# Patient Record
Sex: MALE | Race: WHITE | NOT HISPANIC OR LATINO | Employment: FULL TIME | URBAN - NONMETROPOLITAN AREA
[De-identification: names, ages, dates, MRNs, and addresses within clinical notes are randomized per-mention and may not be internally consistent; named-entity substitution may affect disease eponyms.]

---

## 2017-03-13 RX ORDER — DICYCLOMINE HCL 20 MG
TABLET ORAL
Qty: 60 TABLET | Refills: 12 | Status: SHIPPED | OUTPATIENT
Start: 2017-03-13 | End: 2017-03-29 | Stop reason: SDUPTHER

## 2017-03-29 ENCOUNTER — OFFICE VISIT (OUTPATIENT)
Dept: FAMILY MEDICINE CLINIC | Facility: CLINIC | Age: 52
End: 2017-03-29

## 2017-03-29 VITALS
HEIGHT: 68 IN | DIASTOLIC BLOOD PRESSURE: 80 MMHG | BODY MASS INDEX: 35.25 KG/M2 | SYSTOLIC BLOOD PRESSURE: 136 MMHG | TEMPERATURE: 98.2 F | WEIGHT: 232.6 LBS | HEART RATE: 80 BPM | OXYGEN SATURATION: 98 %

## 2017-03-29 DIAGNOSIS — F33.1 MODERATE EPISODE OF RECURRENT MAJOR DEPRESSIVE DISORDER (HCC): ICD-10-CM

## 2017-03-29 DIAGNOSIS — E29.1 MALE HYPOGONADISM: Primary | ICD-10-CM

## 2017-03-29 DIAGNOSIS — Z79.899 OTHER LONG TERM (CURRENT) DRUG THERAPY: ICD-10-CM

## 2017-03-29 DIAGNOSIS — E78.5 HYPERLIPIDEMIA, UNSPECIFIED HYPERLIPIDEMIA TYPE: ICD-10-CM

## 2017-03-29 DIAGNOSIS — Z12.5 ENCOUNTER FOR PROSTATE CANCER SCREENING: ICD-10-CM

## 2017-03-29 LAB
ALBUMIN SERPL-MCNC: 4.7 G/DL (ref 3.4–4.8)
ALBUMIN/GLOB SERPL: 1.9 G/DL (ref 1.1–1.8)
ALP SERPL-CCNC: 84 U/L (ref 38–126)
ALT SERPL W P-5'-P-CCNC: 25 U/L (ref 21–72)
ANION GAP SERPL CALCULATED.3IONS-SCNC: 13 MMOL/L (ref 5–15)
ARTICHOKE IGE QN: 134 MG/DL (ref 1–129)
AST SERPL-CCNC: 15 U/L (ref 17–59)
BILIRUB SERPL-MCNC: 0.8 MG/DL (ref 0.2–1.3)
BUN BLD-MCNC: 12 MG/DL (ref 7–21)
BUN/CREAT SERPL: 12.2 (ref 7–25)
CALCIUM SPEC-SCNC: 9.9 MG/DL (ref 8.4–10.2)
CHLORIDE SERPL-SCNC: 100 MMOL/L (ref 95–110)
CHOLEST SERPL-MCNC: 201 MG/DL (ref 0–199)
CO2 SERPL-SCNC: 26 MMOL/L (ref 22–31)
CREAT BLD-MCNC: 0.98 MG/DL (ref 0.7–1.3)
DEPRECATED RDW RBC AUTO: 40 FL (ref 35.1–43.9)
ERYTHROCYTE [DISTWIDTH] IN BLOOD BY AUTOMATED COUNT: 13.2 % (ref 11.5–14.5)
GFR SERPL CREATININE-BSD FRML MDRD: 81 ML/MIN/1.73 (ref 56–130)
GLOBULIN UR ELPH-MCNC: 2.5 GM/DL (ref 2.3–3.5)
GLUCOSE BLD-MCNC: 96 MG/DL (ref 60–100)
HCT VFR BLD AUTO: 41 % (ref 39–49)
HDLC SERPL-MCNC: 37 MG/DL (ref 60–200)
HGB BLD-MCNC: 14 G/DL (ref 13.7–17.3)
LDLC/HDLC SERPL: 3.75 {RATIO} (ref 0–3.55)
MCH RBC QN AUTO: 28.5 PG (ref 26.5–34)
MCHC RBC AUTO-ENTMCNC: 34.1 G/DL (ref 31.5–36.3)
MCV RBC AUTO: 83.3 FL (ref 80–98)
PLATELET # BLD AUTO: 327 10*3/MM3 (ref 150–450)
PMV BLD AUTO: 11.1 FL (ref 8–12)
POTASSIUM BLD-SCNC: 4.5 MMOL/L (ref 3.5–5.1)
PROT SERPL-MCNC: 7.2 G/DL (ref 6.3–8.6)
PSA SERPL-MCNC: 0.47 NG/ML (ref 0–4)
RBC # BLD AUTO: 4.92 10*6/MM3 (ref 4.37–5.74)
SODIUM BLD-SCNC: 139 MMOL/L (ref 137–145)
TRIGL SERPL-MCNC: 126 MG/DL (ref 20–199)
WBC NRBC COR # BLD: 7.36 10*3/MM3 (ref 3.2–9.8)

## 2017-03-29 PROCEDURE — 84403 ASSAY OF TOTAL TESTOSTERONE: CPT | Performed by: FAMILY MEDICINE

## 2017-03-29 PROCEDURE — 80061 LIPID PANEL: CPT | Performed by: FAMILY MEDICINE

## 2017-03-29 PROCEDURE — 85027 COMPLETE CBC AUTOMATED: CPT | Performed by: FAMILY MEDICINE

## 2017-03-29 PROCEDURE — 80053 COMPREHEN METABOLIC PANEL: CPT | Performed by: FAMILY MEDICINE

## 2017-03-29 PROCEDURE — 99213 OFFICE O/P EST LOW 20 MIN: CPT | Performed by: FAMILY MEDICINE

## 2017-03-29 PROCEDURE — G0103 PSA SCREENING: HCPCS | Performed by: FAMILY MEDICINE

## 2017-03-29 RX ORDER — LISINOPRIL 20 MG/1
20 TABLET ORAL DAILY
Qty: 90 TABLET | Refills: 3 | Status: SHIPPED | OUTPATIENT
Start: 2017-03-29 | End: 2018-03-27 | Stop reason: SDUPTHER

## 2017-03-29 RX ORDER — ESCITALOPRAM OXALATE 20 MG/1
20 TABLET ORAL DAILY
Qty: 90 TABLET | Refills: 3 | Status: SHIPPED | OUTPATIENT
Start: 2017-03-29 | End: 2017-07-12

## 2017-03-29 RX ORDER — DICYCLOMINE HCL 20 MG
20 TABLET ORAL EVERY 6 HOURS
Qty: 360 TABLET | Refills: 3 | Status: SHIPPED | OUTPATIENT
Start: 2017-03-29 | End: 2018-04-05 | Stop reason: SDUPTHER

## 2017-03-29 NOTE — PROGRESS NOTES
Subjective   Rob Josh Serrato is a 51 y.o. male.     History of Present Illness     No testosterone 1 month.  Can tell a difference.  Has lost weight and is motivated to do more  20mg lexapro helping.  Due for bloodwork.  Social issues at home with son are little better.     Review of Systems   Constitutional: Negative for chills, fatigue and fever.   HENT: Negative for congestion, ear discharge, ear pain, facial swelling, hearing loss, postnasal drip, rhinorrhea, sinus pressure, sore throat, trouble swallowing and voice change.    Eyes: Negative for discharge, redness and visual disturbance.   Respiratory: Negative for cough, chest tightness, shortness of breath and wheezing.    Cardiovascular: Negative for chest pain and palpitations.   Gastrointestinal: Negative for abdominal pain, blood in stool, constipation, diarrhea, nausea and vomiting.   Endocrine: Negative for polydipsia and polyuria.   Genitourinary: Negative for dysuria, flank pain, hematuria and urgency.   Musculoskeletal: Negative for arthralgias, back pain, joint swelling and myalgias.   Skin: Negative for rash.   Neurological: Negative for dizziness, weakness, numbness and headaches.   Hematological: Negative for adenopathy.   Psychiatric/Behavioral: Negative for confusion and sleep disturbance. The patient is not nervous/anxious.        Objective   Physical Exam   Constitutional: He is oriented to person, place, and time. He appears well-developed and well-nourished.   HENT:   Head: Normocephalic and atraumatic.   Right Ear: External ear normal.   Left Ear: External ear normal.   Nose: Nose normal.   Mouth/Throat: Oropharynx is clear and moist.   Eyes: Conjunctivae and EOM are normal. Pupils are equal, round, and reactive to light.   Neck: Normal range of motion. Neck supple.   Cardiovascular: Normal rate, regular rhythm and normal heart sounds.  Exam reveals no gallop and no friction rub.    No murmur heard.  Pulmonary/Chest: Effort normal and  breath sounds normal.   Abdominal: Soft. Bowel sounds are normal. He exhibits no distension. There is no tenderness. There is no rebound and no guarding.   Musculoskeletal: Normal range of motion. He exhibits no edema or deformity.   Neurological: He is alert and oriented to person, place, and time. No cranial nerve deficit.   Skin: Skin is warm and dry. No rash noted. No erythema.   Psychiatric: He has a normal mood and affect. His behavior is normal. Judgment and thought content normal.   Nursing note and vitals reviewed.      Assessment/Plan   Rob was seen today for follow-up, med refill and annual exam.    Diagnoses and all orders for this visit:    Male hypogonadism  -     CBC (No Diff)  -     Comprehensive Metabolic Panel  -     Lipid Panel  -     Testosterone    Hyperlipidemia, unspecified hyperlipidemia type  -     Lipid Panel    Other long term (current) drug therapy  -     CBC (No Diff)  -     Comprehensive Metabolic Panel  -     Lipid Panel  -     PSA Screen    Encounter for prostate cancer screening  -     PSA Screen    Moderate episode of recurrent major depressive disorder  -     escitalopram (LEXAPRO) 20 MG tablet; Take 1 tablet by mouth Daily.    Other orders  -     lisinopril (PRINIVIL,ZESTRIL) 20 MG tablet; Take 1 tablet by mouth Daily.  -     dicyclomine (BENTYL) 20 MG tablet; Take 1 tablet by mouth Every 6 (Six) Hours.    praised for weight loss.  Will help testosterone.  Try no testosterone and see how he does.  We discussed some of the bad side effects of testosterone replacement.     labwork    Also, try to cut down on lexapro dose spring and summer so can increase in fall/winter if needed.

## 2017-03-30 LAB
CONV COMMENT: ABNORMAL
TESTOST SERPL-MCNC: 277 NG/DL (ref 348–1197)

## 2017-04-13 ENCOUNTER — TELEPHONE (OUTPATIENT)
Dept: FAMILY MEDICINE CLINIC | Facility: CLINIC | Age: 52
End: 2017-04-13

## 2017-04-13 RX ORDER — TESTOSTERONE CYPIONATE 200 MG/ML
200 INJECTION, SOLUTION INTRAMUSCULAR
Qty: 2 ML | Refills: 5 | Status: SHIPPED | OUTPATIENT
Start: 2017-04-13 | End: 2019-02-25 | Stop reason: SDUPTHER

## 2017-04-13 NOTE — TELEPHONE ENCOUNTER
----- Message from Ajit Washington sent at 4/13/2017  2:15 PM CDT -----  HE ASKED IF HE SHOULD TAKE TESTOSTERONE EVERY OTHER WEEK TO KEEP LEVELS UP.    ----- Message -----     From: Cb Weaver MD     Sent: 4/13/2017   1:39 PM       To: Ajit Washington    tesosterone low but you hadn't been taking any testosterone.  Otherwise all good!  ----- Message -----     From: Ajit Washington     Sent: 4/13/2017  10:36 AM       To: Cb Weaver MD    PT CALLED ABOUT LAB RESULTS

## 2017-07-11 ENCOUNTER — APPOINTMENT (OUTPATIENT)
Dept: GENERAL RADIOLOGY | Facility: HOSPITAL | Age: 52
End: 2017-07-11

## 2017-07-11 ENCOUNTER — HOSPITAL ENCOUNTER (EMERGENCY)
Facility: HOSPITAL | Age: 52
Discharge: HOME | End: 2017-07-11
Admitting: EMERGENCY MEDICINE

## 2017-07-11 VITALS
WEIGHT: 234 LBS | TEMPERATURE: 97.9 F | HEIGHT: 65 IN | OXYGEN SATURATION: 98 % | DIASTOLIC BLOOD PRESSURE: 56 MMHG | SYSTOLIC BLOOD PRESSURE: 112 MMHG | BODY MASS INDEX: 38.99 KG/M2 | RESPIRATION RATE: 18 BRPM | HEART RATE: 61 BPM

## 2017-07-11 DIAGNOSIS — R07.89 CHEST WALL PAIN: Primary | ICD-10-CM

## 2017-07-11 LAB
ALBUMIN SERPL-MCNC: 4.5 G/DL (ref 3.4–4.8)
ALBUMIN/GLOB SERPL: 1.5 G/DL (ref 1.1–1.8)
ALP SERPL-CCNC: 89 U/L (ref 38–126)
ALT SERPL W P-5'-P-CCNC: 32 U/L (ref 21–72)
ANION GAP SERPL CALCULATED.3IONS-SCNC: 11 MMOL/L (ref 5–15)
AST SERPL-CCNC: 20 U/L (ref 17–59)
BASOPHILS # BLD AUTO: 0.03 10*3/MM3 (ref 0–0.2)
BASOPHILS NFR BLD AUTO: 0.3 % (ref 0–2)
BILIRUB SERPL-MCNC: 0.6 MG/DL (ref 0.2–1.3)
BUN BLD-MCNC: 14 MG/DL (ref 7–21)
BUN/CREAT SERPL: 15.2 (ref 7–25)
CALCIUM SPEC-SCNC: 9.6 MG/DL (ref 8.4–10.2)
CHLORIDE SERPL-SCNC: 100 MMOL/L (ref 95–110)
CO2 SERPL-SCNC: 28 MMOL/L (ref 22–31)
CREAT BLD-MCNC: 0.92 MG/DL (ref 0.7–1.3)
DEPRECATED RDW RBC AUTO: 42.1 FL (ref 35.1–43.9)
EOSINOPHIL # BLD AUTO: 0.1 10*3/MM3 (ref 0–0.7)
EOSINOPHIL NFR BLD AUTO: 1 % (ref 0–7)
ERYTHROCYTE [DISTWIDTH] IN BLOOD BY AUTOMATED COUNT: 13.7 % (ref 11.5–14.5)
GFR SERPL CREATININE-BSD FRML MDRD: 87 ML/MIN/1.73 (ref 60–130)
GLOBULIN UR ELPH-MCNC: 3.1 GM/DL (ref 2.3–3.5)
GLUCOSE BLD-MCNC: 92 MG/DL (ref 60–100)
HCT VFR BLD AUTO: 43.4 % (ref 39–49)
HGB BLD-MCNC: 14.6 G/DL (ref 13.7–17.3)
HOLD SPECIMEN: NORMAL
HOLD SPECIMEN: NORMAL
IMM GRANULOCYTES # BLD: 0.05 10*3/MM3 (ref 0–0.02)
IMM GRANULOCYTES NFR BLD: 0.5 % (ref 0–0.5)
LYMPHOCYTES # BLD AUTO: 3.34 10*3/MM3 (ref 0.6–4.2)
LYMPHOCYTES NFR BLD AUTO: 34.3 % (ref 10–50)
MCH RBC QN AUTO: 28.6 PG (ref 26.5–34)
MCHC RBC AUTO-ENTMCNC: 33.6 G/DL (ref 31.5–36.3)
MCV RBC AUTO: 84.9 FL (ref 80–98)
MONOCYTES # BLD AUTO: 0.65 10*3/MM3 (ref 0–0.9)
MONOCYTES NFR BLD AUTO: 6.7 % (ref 0–12)
NEUTROPHILS # BLD AUTO: 5.58 10*3/MM3 (ref 2–8.6)
NEUTROPHILS NFR BLD AUTO: 57.2 % (ref 37–80)
NT-PROBNP SERPL-MCNC: <11.1 PG/ML (ref 0–900)
PLATELET # BLD AUTO: 288 10*3/MM3 (ref 150–450)
PMV BLD AUTO: 11.4 FL (ref 8–12)
POTASSIUM BLD-SCNC: 3.8 MMOL/L (ref 3.5–5.1)
PROT SERPL-MCNC: 7.6 G/DL (ref 6.3–8.6)
RBC # BLD AUTO: 5.11 10*6/MM3 (ref 4.37–5.74)
SODIUM BLD-SCNC: 139 MMOL/L (ref 137–145)
TROPONIN I SERPL-MCNC: 0.01 NG/ML
TROPONIN I SERPL-MCNC: <0.012 NG/ML
WBC NRBC COR # BLD: 9.75 10*3/MM3 (ref 3.2–9.8)
WHOLE BLOOD HOLD SPECIMEN: NORMAL
WHOLE BLOOD HOLD SPECIMEN: NORMAL

## 2017-07-11 PROCEDURE — 36415 COLL VENOUS BLD VENIPUNCTURE: CPT

## 2017-07-11 PROCEDURE — 96361 HYDRATE IV INFUSION ADD-ON: CPT

## 2017-07-11 PROCEDURE — 85025 COMPLETE CBC W/AUTO DIFF WBC: CPT

## 2017-07-11 PROCEDURE — 80053 COMPREHEN METABOLIC PANEL: CPT

## 2017-07-11 PROCEDURE — 99284 EMERGENCY DEPT VISIT MOD MDM: CPT

## 2017-07-11 PROCEDURE — 84484 ASSAY OF TROPONIN QUANT: CPT

## 2017-07-11 PROCEDURE — 96360 HYDRATION IV INFUSION INIT: CPT

## 2017-07-11 PROCEDURE — 71020 HC CHEST PA AND LATERAL: CPT

## 2017-07-11 PROCEDURE — 93005 ELECTROCARDIOGRAM TRACING: CPT

## 2017-07-11 PROCEDURE — 84484 ASSAY OF TROPONIN QUANT: CPT | Performed by: PHYSICIAN ASSISTANT

## 2017-07-11 PROCEDURE — 83880 ASSAY OF NATRIURETIC PEPTIDE: CPT

## 2017-07-11 PROCEDURE — 93010 ELECTROCARDIOGRAM REPORT: CPT | Performed by: INTERNAL MEDICINE

## 2017-07-11 RX ORDER — ALUMINA, MAGNESIA, AND SIMETHICONE 2400; 2400; 240 MG/30ML; MG/30ML; MG/30ML
15 SUSPENSION ORAL ONCE
Status: COMPLETED | OUTPATIENT
Start: 2017-07-11 | End: 2017-07-11

## 2017-07-11 RX ORDER — VENLAFAXINE HYDROCHLORIDE 150 MG/1
150 CAPSULE, EXTENDED RELEASE ORAL
COMMUNITY
End: 2017-07-12 | Stop reason: ALTCHOICE

## 2017-07-11 RX ORDER — ASPIRIN 81 MG/1
324 TABLET, CHEWABLE ORAL ONCE
Status: COMPLETED | OUTPATIENT
Start: 2017-07-11 | End: 2017-07-11

## 2017-07-11 RX ORDER — SODIUM CHLORIDE 0.9 % (FLUSH) 0.9 %
10 SYRINGE (ML) INJECTION AS NEEDED
Status: DISCONTINUED | OUTPATIENT
Start: 2017-07-11 | End: 2017-07-11 | Stop reason: HOSPADM

## 2017-07-11 RX ORDER — NITROGLYCERIN 0.4 MG/1
0.4 TABLET SUBLINGUAL
Status: DISCONTINUED | OUTPATIENT
Start: 2017-07-11 | End: 2017-07-11 | Stop reason: HOSPADM

## 2017-07-11 RX ADMIN — NITROGLYCERIN 0.4 MG: 0.4 TABLET SUBLINGUAL at 11:02

## 2017-07-11 RX ADMIN — ASPIRIN 81 MG 324 MG: 81 TABLET ORAL at 11:02

## 2017-07-11 RX ADMIN — SODIUM CHLORIDE 1000 ML: 9 INJECTION, SOLUTION INTRAVENOUS at 12:25

## 2017-07-11 RX ADMIN — ALUMINUM HYDROXIDE, MAGNESIUM HYDROXIDE, AND DIMETHICONE 15 ML: 400; 400; 40 SUSPENSION ORAL at 12:24

## 2017-07-11 RX ADMIN — Medication 10 ML: at 11:03

## 2017-07-11 RX ADMIN — LIDOCAINE HYDROCHLORIDE 15 ML: 20 SOLUTION ORAL; TOPICAL at 12:24

## 2017-07-12 ENCOUNTER — OFFICE VISIT (OUTPATIENT)
Dept: FAMILY MEDICINE CLINIC | Facility: CLINIC | Age: 52
End: 2017-07-12

## 2017-07-12 VITALS
BODY MASS INDEX: 38.09 KG/M2 | TEMPERATURE: 98.2 F | SYSTOLIC BLOOD PRESSURE: 130 MMHG | WEIGHT: 228.6 LBS | HEART RATE: 87 BPM | HEIGHT: 65 IN | DIASTOLIC BLOOD PRESSURE: 80 MMHG | OXYGEN SATURATION: 98 %

## 2017-07-12 DIAGNOSIS — K21.9 GASTROESOPHAGEAL REFLUX DISEASE, ESOPHAGITIS PRESENCE NOT SPECIFIED: ICD-10-CM

## 2017-07-12 DIAGNOSIS — F33.1 MODERATE EPISODE OF RECURRENT MAJOR DEPRESSIVE DISORDER (HCC): ICD-10-CM

## 2017-07-12 DIAGNOSIS — R06.02 SOB (SHORTNESS OF BREATH): Primary | ICD-10-CM

## 2017-07-12 PROCEDURE — 99214 OFFICE O/P EST MOD 30 MIN: CPT | Performed by: FAMILY MEDICINE

## 2017-07-12 RX ORDER — VENLAFAXINE HYDROCHLORIDE 75 MG/1
75 CAPSULE, EXTENDED RELEASE ORAL DAILY
Qty: 30 CAPSULE | Refills: 1 | Status: SHIPPED | OUTPATIENT
Start: 2017-07-12 | End: 2017-09-19 | Stop reason: SDUPTHER

## 2017-07-12 RX ORDER — ESCITALOPRAM OXALATE 20 MG/1
10 TABLET ORAL DAILY
Qty: 90 TABLET | Refills: 3 | Status: SHIPPED | OUTPATIENT
Start: 2017-07-12 | End: 2018-07-21 | Stop reason: SDUPTHER

## 2017-07-12 RX ORDER — FAMOTIDINE 40 MG/1
40 TABLET, FILM COATED ORAL DAILY
Qty: 30 TABLET | Refills: 11 | Status: SHIPPED | OUTPATIENT
Start: 2017-07-12 | End: 2019-02-20

## 2017-07-12 RX ORDER — OMEPRAZOLE 20 MG/1
20 CAPSULE, DELAYED RELEASE ORAL DAILY
Qty: 30 CAPSULE | Refills: 11 | Status: SHIPPED | OUTPATIENT
Start: 2017-07-12 | End: 2019-02-20

## 2017-07-13 ENCOUNTER — APPOINTMENT (OUTPATIENT)
Dept: CT IMAGING | Facility: HOSPITAL | Age: 52
End: 2017-07-13

## 2017-07-13 ENCOUNTER — OFFICE VISIT (OUTPATIENT)
Dept: SLEEP MEDICINE | Facility: HOSPITAL | Age: 52
End: 2017-07-13

## 2017-07-13 ENCOUNTER — HOSPITAL ENCOUNTER (EMERGENCY)
Facility: HOSPITAL | Age: 52
Discharge: HOME | End: 2017-07-13
Attending: EMERGENCY MEDICINE | Admitting: NURSE PRACTITIONER

## 2017-07-13 VITALS
BODY MASS INDEX: 37.65 KG/M2 | OXYGEN SATURATION: 98 % | HEART RATE: 86 BPM | HEIGHT: 65 IN | WEIGHT: 226 LBS | DIASTOLIC BLOOD PRESSURE: 70 MMHG | SYSTOLIC BLOOD PRESSURE: 120 MMHG

## 2017-07-13 VITALS
TEMPERATURE: 97.7 F | RESPIRATION RATE: 18 BRPM | SYSTOLIC BLOOD PRESSURE: 113 MMHG | WEIGHT: 230.3 LBS | HEART RATE: 61 BPM | BODY MASS INDEX: 39.32 KG/M2 | DIASTOLIC BLOOD PRESSURE: 86 MMHG | HEIGHT: 64 IN | OXYGEN SATURATION: 100 %

## 2017-07-13 DIAGNOSIS — I20.8 ANGINA AT REST (HCC): ICD-10-CM

## 2017-07-13 DIAGNOSIS — Z99.89 OSA ON CPAP: Primary | ICD-10-CM

## 2017-07-13 DIAGNOSIS — R07.89 CHEST WALL PAIN: Primary | ICD-10-CM

## 2017-07-13 DIAGNOSIS — G47.33 OSA ON CPAP: Primary | ICD-10-CM

## 2017-07-13 PROBLEM — I20.89 ANGINA AT REST: Status: ACTIVE | Noted: 2017-07-13

## 2017-07-13 LAB
ALBUMIN SERPL-MCNC: 4.4 G/DL (ref 3.4–4.8)
ALBUMIN/GLOB SERPL: 1.3 G/DL (ref 1.1–1.8)
ALP SERPL-CCNC: 85 U/L (ref 38–126)
ALT SERPL W P-5'-P-CCNC: 27 U/L (ref 21–72)
ANION GAP SERPL CALCULATED.3IONS-SCNC: 13 MMOL/L (ref 5–15)
AST SERPL-CCNC: 15 U/L (ref 17–59)
BASOPHILS # BLD AUTO: 0.03 10*3/MM3 (ref 0–0.2)
BASOPHILS NFR BLD AUTO: 0.3 % (ref 0–2)
BILIRUB SERPL-MCNC: 0.6 MG/DL (ref 0.2–1.3)
BUN BLD-MCNC: 13 MG/DL (ref 7–21)
BUN/CREAT SERPL: 13.5 (ref 7–25)
CALCIUM SPEC-SCNC: 9.2 MG/DL (ref 8.4–10.2)
CHLORIDE SERPL-SCNC: 99 MMOL/L (ref 95–110)
CK MB SERPL-CCNC: 0.6 NG/ML (ref 0–5)
CK SERPL-CCNC: 91 U/L (ref 55–170)
CO2 SERPL-SCNC: 25 MMOL/L (ref 22–31)
CREAT BLD-MCNC: 0.96 MG/DL (ref 0.7–1.3)
DEPRECATED RDW RBC AUTO: 41.4 FL (ref 35.1–43.9)
EOSINOPHIL # BLD AUTO: 0.06 10*3/MM3 (ref 0–0.7)
EOSINOPHIL NFR BLD AUTO: 0.6 % (ref 0–7)
ERYTHROCYTE [DISTWIDTH] IN BLOOD BY AUTOMATED COUNT: 13.6 % (ref 11.5–14.5)
GFR SERPL CREATININE-BSD FRML MDRD: 83 ML/MIN/1.73 (ref 60–130)
GLOBULIN UR ELPH-MCNC: 3.3 GM/DL (ref 2.3–3.5)
GLUCOSE BLD-MCNC: 85 MG/DL (ref 60–100)
HCT VFR BLD AUTO: 41 % (ref 39–49)
HGB BLD-MCNC: 13.9 G/DL (ref 13.7–17.3)
HOLD SPECIMEN: NORMAL
IMM GRANULOCYTES # BLD: 0.03 10*3/MM3 (ref 0–0.02)
IMM GRANULOCYTES NFR BLD: 0.3 % (ref 0–0.5)
LYMPHOCYTES # BLD AUTO: 3.26 10*3/MM3 (ref 0.6–4.2)
LYMPHOCYTES NFR BLD AUTO: 33.3 % (ref 10–50)
MCH RBC QN AUTO: 28.4 PG (ref 26.5–34)
MCHC RBC AUTO-ENTMCNC: 33.9 G/DL (ref 31.5–36.3)
MCV RBC AUTO: 83.8 FL (ref 80–98)
MONOCYTES # BLD AUTO: 0.77 10*3/MM3 (ref 0–0.9)
MONOCYTES NFR BLD AUTO: 7.9 % (ref 0–12)
NEUTROPHILS # BLD AUTO: 5.64 10*3/MM3 (ref 2–8.6)
NEUTROPHILS NFR BLD AUTO: 57.6 % (ref 37–80)
PLATELET # BLD AUTO: 290 10*3/MM3 (ref 150–450)
PMV BLD AUTO: 11.4 FL (ref 8–12)
POTASSIUM BLD-SCNC: 3.6 MMOL/L (ref 3.5–5.1)
PROT SERPL-MCNC: 7.7 G/DL (ref 6.3–8.6)
RBC # BLD AUTO: 4.89 10*6/MM3 (ref 4.37–5.74)
SODIUM BLD-SCNC: 137 MMOL/L (ref 137–145)
TROPONIN I SERPL-MCNC: <0.012 NG/ML
WBC NRBC COR # BLD: 9.79 10*3/MM3 (ref 3.2–9.8)
WHOLE BLOOD HOLD SPECIMEN: NORMAL

## 2017-07-13 PROCEDURE — 25010000002 MORPHINE SULFATE (PF) 2 MG/ML SOLUTION: Performed by: NURSE PRACTITIONER

## 2017-07-13 PROCEDURE — 99284 EMERGENCY DEPT VISIT MOD MDM: CPT

## 2017-07-13 PROCEDURE — 80053 COMPREHEN METABOLIC PANEL: CPT | Performed by: NURSE PRACTITIONER

## 2017-07-13 PROCEDURE — 82553 CREATINE MB FRACTION: CPT | Performed by: NURSE PRACTITIONER

## 2017-07-13 PROCEDURE — 85025 COMPLETE CBC W/AUTO DIFF WBC: CPT | Performed by: NURSE PRACTITIONER

## 2017-07-13 PROCEDURE — 93010 ELECTROCARDIOGRAM REPORT: CPT | Performed by: INTERNAL MEDICINE

## 2017-07-13 PROCEDURE — 96361 HYDRATE IV INFUSION ADD-ON: CPT

## 2017-07-13 PROCEDURE — 93005 ELECTROCARDIOGRAM TRACING: CPT | Performed by: EMERGENCY MEDICINE

## 2017-07-13 PROCEDURE — 96374 THER/PROPH/DIAG INJ IV PUSH: CPT

## 2017-07-13 PROCEDURE — 99204 OFFICE O/P NEW MOD 45 MIN: CPT | Performed by: INTERNAL MEDICINE

## 2017-07-13 PROCEDURE — 0 IOPAMIDOL PER 1 ML: Performed by: EMERGENCY MEDICINE

## 2017-07-13 PROCEDURE — 82550 ASSAY OF CK (CPK): CPT | Performed by: NURSE PRACTITIONER

## 2017-07-13 PROCEDURE — 84484 ASSAY OF TROPONIN QUANT: CPT | Performed by: NURSE PRACTITIONER

## 2017-07-13 PROCEDURE — 71275 CT ANGIOGRAPHY CHEST: CPT

## 2017-07-13 RX ORDER — SODIUM CHLORIDE 9 MG/ML
125 INJECTION, SOLUTION INTRAVENOUS CONTINUOUS
Status: DISCONTINUED | OUTPATIENT
Start: 2017-07-13 | End: 2017-07-13 | Stop reason: HOSPADM

## 2017-07-13 RX ORDER — MORPHINE SULFATE 2 MG/ML
2 INJECTION, SOLUTION INTRAMUSCULAR; INTRAVENOUS ONCE
Status: COMPLETED | OUTPATIENT
Start: 2017-07-13 | End: 2017-07-13

## 2017-07-13 RX ORDER — SODIUM CHLORIDE 0.9 % (FLUSH) 0.9 %
10 SYRINGE (ML) INJECTION AS NEEDED
Status: DISCONTINUED | OUTPATIENT
Start: 2017-07-13 | End: 2017-07-13 | Stop reason: HOSPADM

## 2017-07-13 RX ADMIN — SODIUM CHLORIDE 125 ML/HR: 900 INJECTION, SOLUTION INTRAVENOUS at 17:13

## 2017-07-13 RX ADMIN — MORPHINE SULFATE 2 MG: 2 INJECTION, SOLUTION INTRAMUSCULAR; INTRAVENOUS at 17:13

## 2017-07-13 RX ADMIN — IOPAMIDOL 64 ML: 755 INJECTION, SOLUTION INTRAVENOUS at 18:25

## 2017-07-13 NOTE — PROGRESS NOTES
Subjective   Rob Serrato is a 51 y.o. male.     History of Present Illness     Sudden sob/cp. Went to er.  Neg cardiac workup, given gi cocktail and symptoms gone.  Still has squeezing/pressure sides of chest.   Lots of stress, job very stressful.  He is having gerd.  Had nissen dr guerin 6 years ago.       Review of Systems   Constitutional: Negative for chills, fatigue and fever.   HENT: Negative for congestion, ear discharge, ear pain, facial swelling, hearing loss, postnasal drip, rhinorrhea, sinus pressure, sore throat, trouble swallowing and voice change.    Eyes: Negative for discharge, redness and visual disturbance.   Respiratory: Positive for chest tightness. Negative for cough, shortness of breath and wheezing.    Cardiovascular: Positive for chest pain. Negative for palpitations.   Gastrointestinal: Negative for abdominal pain, blood in stool, constipation, diarrhea, nausea and vomiting.   Endocrine: Negative for polydipsia and polyuria.   Genitourinary: Negative for dysuria, flank pain, hematuria and urgency.   Musculoskeletal: Negative for arthralgias, back pain, joint swelling and myalgias.   Skin: Negative for rash.   Neurological: Negative for dizziness, weakness, numbness and headaches.   Hematological: Negative for adenopathy.   Psychiatric/Behavioral: Negative for confusion and sleep disturbance. The patient is not nervous/anxious.        Objective   Physical Exam   Constitutional: He is oriented to person, place, and time. He appears well-developed and well-nourished.   HENT:   Head: Normocephalic and atraumatic.   Right Ear: External ear normal.   Left Ear: External ear normal.   Nose: Nose normal.   Mouth/Throat: Oropharynx is clear and moist.   Eyes: Conjunctivae and EOM are normal. Pupils are equal, round, and reactive to light.   Neck: Normal range of motion. Neck supple.   Cardiovascular: Normal rate, regular rhythm and normal heart sounds.  Exam reveals no gallop and no friction rub.     No murmur heard.  Pulmonary/Chest: Effort normal and breath sounds normal.   Abdominal: Soft. Bowel sounds are normal. He exhibits no distension. There is no tenderness. There is no rebound and no guarding.   Musculoskeletal: Normal range of motion. He exhibits no edema or deformity.   Neurological: He is alert and oriented to person, place, and time. No cranial nerve deficit.   Skin: Skin is warm and dry. No rash noted. No erythema.   Psychiatric: He has a normal mood and affect. His behavior is normal. Judgment and thought content normal.   Nursing note and vitals reviewed.      Assessment/Plan   Rob was seen today for follow-up.    Diagnoses and all orders for this visit:    SOB (shortness of breath)  -     D-dimer, Quantitative    Gastroesophageal reflux disease, esophagitis presence not specified  -     Ambulatory Referral to General Surgery    Moderate episode of recurrent major depressive disorder  -     escitalopram (LEXAPRO) 20 MG tablet; Take 0.5 tablets by mouth Daily.    Other orders  -     omeprazole (PRILOSEC) 20 MG capsule; Take 1 capsule by mouth Daily.  -     famotidine (PEPCID) 40 MG tablet; Take 1 tablet by mouth Daily.  -     venlafaxine XR (EFFEXOR XR) 75 MG 24 hr capsule; Take 1 capsule by mouth Daily.      Lots of stress, lots of anxiety.  Go to 1/2 of lexapro dose (10mg) nightly, start 75mg effexor xr in am.  He was up to 300mg in past and made him sleepy.    Change jobs    Since on testosterone, some concern for pe.  Will check d dimer.     Gerd, meds and refer to dr guerin since nissen

## 2017-07-13 NOTE — ED PROVIDER NOTES
Subjective   HPI Comments: C/o tightness in chest onset 3 days ago. He was seen here in ED then and released. He followed up with his PCP, Dr Weaver yesterday, feels this may be GERD. Seen  Dr Dumont, and he wants to rule out PE so sent back to ED. He states he has had the chest tightness since Tuesday. Denies ever having had DVT. He does take testosterone.      History provided by:  Patient      Review of Systems   Constitutional: Negative.    HENT: Negative.    Eyes: Negative.    Respiratory: Positive for chest tightness.    Cardiovascular: Negative.    Gastrointestinal: Negative.    Genitourinary: Negative.    Musculoskeletal: Negative.    Skin: Negative.    Neurological: Negative.    Psychiatric/Behavioral: Negative.        Past Medical History:   Diagnosis Date   • Acute allergic reaction    • Acute pharyngitis     unspecified    • Anxiety state    • Asthma    • Disorder of skeletal system    • Dyspnea    • Encounter for screening for malignant neoplasm of prostate    • Furuncle of trunk     resolving    • Gastroesophageal reflux disease    • High risk medication use     long term     • History of colonoscopy 12/04/2008    Colon endoscopy 59907 (1)     • History of esophagogastroduodenoscopy 02/19/2010    EGD 04650 (1)     • Hyperlipidemia    • Lateral epicondylitis    • Malaise and fatigue    • Male hypogonadism    • Other long term (current) drug therapy    • Pain in left wrist     unknown etiology    • Pain in lower limb     injection site of testosterone    • Pain in throat    • Restless legs    • Screening for malignant neoplasm of prostate    • Shoulder pain     now improved     • Sleep apnea    • Staphylococcal infectious disease     skin infections - add bactroban to nares    • Tobacco dependence syndrome    • Ureteric stone     rt 2mm stone   • Vertigo        Allergies   Allergen Reactions   • Codeine      Hallucinations   • Penicillins      Other Reaction       Past Surgical History:   Procedure  "Laterality Date   • INJECTION OF MEDICATION  07/10/2016    Kenalog (2)  -  ULICES Bell   • INJECTION OF MEDICATION  03/16/2012    Toradol (1)  - TORIN Jones   • LAPAROSCOPY ESOPHAGOGASTRIC FUNDOPLASTY HYBRID  03/09/2010    Fundoplasty, laparoscopic (1)   • OTHER SURGICAL HISTORY  04/12/2012    I&D, Simple 07401 (1)  -  DIANE Bernal       History reviewed. No pertinent family history.    Social History     Social History   • Marital status:      Spouse name: N/A   • Number of children: N/A   • Years of education: N/A     Social History Main Topics   • Smoking status: Current Every Day Smoker     Packs/day: 0.50     Types: Electronic Cigarette   • Smokeless tobacco: None      Comment: Current Smoker   • Alcohol use Yes      Comment: social   • Drug use: No   • Sexual activity: Defer     Other Topics Concern   • None     Social History Narrative           Objective   Physical Exam   Constitutional: He is oriented to person, place, and time. He appears well-developed and well-nourished.   HENT:   Head: Normocephalic.   Eyes: EOM are normal. Pupils are equal, round, and reactive to light.   Neck: Normal range of motion. Neck supple.   Cardiovascular: Normal rate, regular rhythm and normal heart sounds.    Pulmonary/Chest: Effort normal and breath sounds normal.   Abdominal: Soft. Bowel sounds are normal.   Neurological: He is alert and oriented to person, place, and time.   Skin: Skin is warm and dry.   Psychiatric:   Pt admits being very anxious. He is tearful.   Nursing note and vitals reviewed.  /86 (BP Location: Right arm, Patient Position: Lying)  Pulse 61  Temp 97.7 °F (36.5 °C) (Oral)   Resp 18  Ht 64\" (162.6 cm)  Wt 230 lb 4.8 oz (104 kg)  SpO2 100%  BMI 39.53 kg/m2      ECG 12 Lead    Date/Time: 7/13/2017 5:00 PM  Performed by: GENEVIEVE JC  Authorized by: BECKY GARCIA   Interpreted by physician  Rhythm: sinus rhythm  BPM: 77  Clinical impression: normal ECG                 ED Course  ED Course "      Results for orders placed or performed during the hospital encounter of 07/13/17   Comprehensive Metabolic Panel   Result Value Ref Range    Glucose 85 60 - 100 mg/dL    BUN 13 7 - 21 mg/dL    Creatinine 0.96 0.70 - 1.30 mg/dL    Sodium 137 137 - 145 mmol/L    Potassium 3.6 3.5 - 5.1 mmol/L    Chloride 99 95 - 110 mmol/L    CO2 25.0 22.0 - 31.0 mmol/L    Calcium 9.2 8.4 - 10.2 mg/dL    Total Protein 7.7 6.3 - 8.6 g/dL    Albumin 4.40 3.40 - 4.80 g/dL    ALT (SGPT) 27 21 - 72 U/L    AST (SGOT) 15 (L) 17 - 59 U/L    Alkaline Phosphatase 85 38 - 126 U/L    Total Bilirubin 0.6 0.2 - 1.3 mg/dL    eGFR Non African Amer 83 >60 mL/min/1.73    Globulin 3.3 2.3 - 3.5 gm/dL    A/G Ratio 1.3 1.1 - 1.8 g/dL    BUN/Creatinine Ratio 13.5 7.0 - 25.0    Anion Gap 13.0 5.0 - 15.0 mmol/L   CK   Result Value Ref Range    Creatine Kinase 91 55 - 170 U/L   CK-MB   Result Value Ref Range    CKMB 0.60 0.00 - 5.00 ng/mL   Troponin   Result Value Ref Range    Troponin I <0.012 <=0.034 ng/mL   CBC Auto Differential   Result Value Ref Range    WBC 9.79 3.20 - 9.80 10*3/mm3    RBC 4.89 4.37 - 5.74 10*6/mm3    Hemoglobin 13.9 13.7 - 17.3 g/dL    Hematocrit 41.0 39.0 - 49.0 %    MCV 83.8 80.0 - 98.0 fL    MCH 28.4 26.5 - 34.0 pg    MCHC 33.9 31.5 - 36.3 g/dL    RDW 13.6 11.5 - 14.5 %    RDW-SD 41.4 35.1 - 43.9 fl    MPV 11.4 8.0 - 12.0 fL    Platelets 290 150 - 450 10*3/mm3    Neutrophil % 57.6 37.0 - 80.0 %    Lymphocyte % 33.3 10.0 - 50.0 %    Monocyte % 7.9 0.0 - 12.0 %    Eosinophil % 0.6 0.0 - 7.0 %    Basophil % 0.3 0.0 - 2.0 %    Immature Grans % 0.3 0.0 - 0.5 %    Neutrophils, Absolute 5.64 2.00 - 8.60 10*3/mm3    Lymphocytes, Absolute 3.26 0.60 - 4.20 10*3/mm3    Monocytes, Absolute 0.77 0.00 - 0.90 10*3/mm3    Eosinophils, Absolute 0.06 0.00 - 0.70 10*3/mm3    Basophils, Absolute 0.03 0.00 - 0.20 10*3/mm3    Immature Grans, Absolute 0.03 (H) 0.00 - 0.02 10*3/mm3   Light Blue Top   Result Value Ref Range    Extra Tube hold for  add-on    Gold Top - SST   Result Value Ref Range    Extra Tube Hold for add-ons.        CT Angiogram Chest With Contrast   Final Result   CONCLUSION:            1.  No evidence of pulmonary embolism.             2.  No evidence of pathology associated with the visualized   aorta.                                                     Electronically signed by:  JOSE A Salmeron MD  7/13/2017 6:40   PM CDT Workstation: [id number]                HEART Score  History: Slightly suspicious (+0)  ECG: Normal (+0)  Age: 45 through 65 (+1)  Risk Factors: 3 or more risk factors OR history of atherosclerotic disease (+2)  Troponin: Normal limit or lower (+0)  Total: 3         MDM  Number of Diagnoses or Management Options  Chest wall pain:   Diagnosis management comments: Arrived to ED after visit to Dr Dumont, sleep medicine, with continued SOB, tightness in chest. Seen here for same c/o 3 days ago, and released. Concern for possible PE. Troponin negative. CTA negative for PE. DC to home with wife, and he will follow up as scheduled with Dr Aceves and Dr Weaver.      Final diagnoses:   Chest wall pain            Kailee Morton, APRN  07/13/17 [id number]

## 2017-07-13 NOTE — DISCHARGE INSTRUCTIONS
Keep appointments with Dr Aceves and with Dr Weaver for follow up.  May return to ED any worsening of symptoms.

## 2017-07-13 NOTE — PATIENT INSTRUCTIONS
ASSESSMENT:  1. Obstructive sleep apnea (PSG on 11/16/2010, AHI of 103, CPAP titration on 12/08/2010 - CPAP of 14 cm H2O)  2. Angina on testosterone - possible VTE - will order emergent Duplex LE, d-dimer, and CTA chest with BMP prior. Needs cardiac stress testing as outpatient  3. Residual sleepiness - if no improvement with change in mask and negative for VTE, consider repeat titration study  4.   Large leak - new supplies ordered today.  5. Follow up with Cb Weaver MD asap and return to my clinic on 07/17/2017. If symptoms worsen, call 911, or present emergency department

## 2017-07-13 NOTE — PROGRESS NOTES
Sleep Clinic Follow Up    Date: 7/13/2017  Primary Care Physician: Cb Weaver MD      CHIEF COMPLAINT: previously stable JOSSELINE now with un refreshing sleep. Last seen here on 04/19/2011    HPI: this has been present for weeks. He developed severer substernal chest pressure on 07/11/2017 after showering , went to ED, dx of severe GERD. He has history of Nissen ~ 7 years ago. His bedtime routine and medications had not changed. Bedtime varies on special needs son's needs. Bedtime 8700-1660, up at 6 am. He rarely awakens in the middle of night. He does not normally nap. He drinks tea (36 oz)  and Pepsi (18 oz) per day. He admits to vice like chest pain since last 07/11/2017.      PAP Data:  Time frame: 07/15/2013 - 07/12/2017   Compliance 98.2 %  PAP range : 14 cm H2O  Average 90% pressure: 14 cmH2O  Leak: 01:32:56 hours  Average AHI 2.2 events/hr  Mask type: full face mask  DME: [address],  [facility]      Epw orth Sleepiness Scale Score    0 = no chance of dozing   1 = slight chance of dozing   2 = moderate chance of dozing   3 = high chance of dozing       SITUATION CHANCE OF DOZING   Sitting and reading ___3______   Watching TV ____1_________   Sitting inactive in a public place (e.g a theater or a meeting) ___0__________   As a passenger in a car for an hour without a break __0___________   Lying down to rest in the afternoon when circumstances permit ______3_______   Sitting and talking to someone ______0_______   Sitting quietly after a lunch without alcohol _____0________   In a car, while stopped for a few minutes in traffic ___0__________       Total ____7_____-        He denies abnormal dreams, sleep paralysis, hypnagogic hallucinations, cataplexy symptoms, RLS    PMHx, FH, SH reviewed and pertinent changes are HTN, angina    REVIEW OF SYSTEMS:   Complete ROS of systems was performed  Negative for chest pain, fever, chills, SOA, abdominal pain.      Exam:  Vitals:     07/13/17 1514   BP: 120/70   Pulse: 86   SpO2: 98%     Body mass index is 37.61 kg/(m^2).    Gen:  No distress, appears stated age, alert, oriented to person, place, and time  Heent:   NC/AT, PERRLA, EOMI, anicteric sclera, external ears normal, OP clear, Mallamati 4, nares patent, dentures, uppe, and lower  NECK:  Supple, midline trachea, no palpable goiter  LUNGS: Clear breath sounds bilaterally, nonlabored breathing  CV:  Normal S1/S2  ABD:  Non tender, non distended, bowel sounds not appreciated  EXT:  No cyanosis or clubbing    Past Medical History:   Diagnosis Date   • Acute allergic reaction    • Acute pharyngitis     unspecified    • Anxiety state    • Asthma    • Disorder of skeletal system    • Dyspnea    • Encounter for screening for malignant neoplasm of prostate    • Furuncle of trunk     resolving    • Gastroesophageal reflux disease    • High risk medication use     long term     • History of colonoscopy 12/04/2008    Colon endoscopy 46499 (1)     • History of esophagogastroduodenoscopy 02/19/2010    EGD 42757 (1)     • Hyperlipidemia    • Lateral epicondylitis    • Malaise and fatigue    • Male hypogonadism    • Other long term (current) drug therapy    • Pain in left wrist     unknown etiology    • Pain in lower limb     injection site of testosterone    • Pain in throat    • Restless legs    • Screening for malignant neoplasm of prostate    • Shoulder pain     now improved     • Sleep apnea    • Staphylococcal infectious disease     skin infections - add bactroban to nares    • Tobacco dependence syndrome    • Ureteric stone     rt 2mm stone   • Vertigo        Current Outpatient Prescriptions:   •  dicyclomine (BENTYL) 20 MG tablet, Take 1 tablet by mouth Every 6 (Six) Hours., Disp: 360 tablet, Rfl: 3  •  escitalopram (LEXAPRO) 20 MG tablet, Take 0.5 tablets by mouth Daily., Disp: 90 tablet, Rfl: 3  •  famotidine (PEPCID) 40 MG tablet, Take 1 tablet by mouth Daily., Disp: 30 tablet, Rfl: 11  •   lisinopril (PRINIVIL,ZESTRIL) 20 MG tablet, Take 1 tablet by mouth Daily., Disp: 90 tablet, Rfl: 3  •  omeprazole (PRILOSEC) 20 MG capsule, Take 1 capsule by mouth Daily., Disp: 30 capsule, Rfl: 11  •  Testosterone Cypionate (DEPO-TESTOSTERONE) 200 MG/ML injection, Inject 1 mL into the shoulder, thigh, or buttocks Every 14 (Fourteen) Days. Inject 200 mg (1ML) every week., Disp: 2 mL, Rfl: 5  •  venlafaxine XR (EFFEXOR XR) 75 MG 24 hr capsule, Take 1 capsule by mouth Daily., Disp: 30 capsule, Rfl: 1    ASSESSMENT:  1. Obstructive sleep apnea (PSG on 11/16/2010, AHI of 103, CPAP titration on 12/08/2010 - CPAP of 14 cm H2O)  2. Angina on testosterone - possible VTE - will order emergent Duplex LE, d-dimer, and CTA chest with BMP prior. Needs cardiac stress testing as outpatient  3. Residual sleepiness - if no improvement with change in mask and negative for VTE, consider repeat titration study  4.   Large leak - new supplies ordered today.  5. Follow up with Cb Weaver MD asap and return to my clinic on 07/17/2017. If symptoms worsen, call 911, or present emergency department        Markie Dumont MD        CC: Cb Weaver MD          No ref. provider found

## 2017-07-13 NOTE — ED PROVIDER NOTES
Subjective   Patient is a 51 y.o. male presenting with chest pain.   History provided by:  Patient   used: No    Chest Pain   Pain location:  Substernal area  Pain quality: aching and burning    Pain quality: not crushing, not dull, not hot, no pressure, not radiating, not sharp, not shooting, not stabbing, not tearing and no tightness    Pain radiates to:  Does not radiate  Pain severity:  Mild  Onset quality:  Sudden  Duration:  1 day  Timing:  Intermittent  Progression:  Worsening  Context: not breathing, not drug use, not eating, not intercourse, not lifting, not movement, not raising an arm, not at rest, not stress and not trauma    Relieved by:  Nothing  Worsened by:  Nothing  Ineffective treatments:  None tried  Associated symptoms: no abdominal pain, no AICD problem, no altered mental status, no anorexia, no anxiety, no back pain, no claudication, no cough, no diaphoresis, no dizziness, no dysphagia, no fatigue, no fever, no headache, no heartburn, no lower extremity edema, no nausea, no near-syncope, no numbness, no orthopnea, no palpitations, no PND, no shortness of breath, no syncope, no vomiting and no weakness    Risk factors: no aortic disease, no birth control, no coronary artery disease, no diabetes mellitus, no Boston-Danlos syndrome, no high cholesterol, no hypertension, no immobilization, not male, no Marfan's syndrome, not obese, not pregnant, no prior DVT/PE, no smoking and no surgery        Review of Systems   Constitutional: Negative.  Negative for diaphoresis, fatigue and fever.   HENT: Negative.  Negative for trouble swallowing.    Eyes: Negative.    Respiratory: Negative.  Negative for cough and shortness of breath.    Cardiovascular: Positive for chest pain. Negative for palpitations, orthopnea, claudication, leg swelling, syncope, PND and near-syncope.   Gastrointestinal: Negative.  Negative for abdominal pain, anorexia, heartburn, nausea and vomiting.   Endocrine:  Negative.    Genitourinary: Negative.    Musculoskeletal: Negative.  Negative for back pain.   Skin: Negative.    Allergic/Immunologic: Negative.    Neurological: Negative.  Negative for dizziness, weakness, numbness and headaches.   Hematological: Negative.    Psychiatric/Behavioral: Negative.        Past Medical History:   Diagnosis Date   • Acute allergic reaction    • Acute pharyngitis     unspecified    • Anxiety state    • Asthma    • Disorder of skeletal system    • Dyspnea    • Encounter for screening for malignant neoplasm of prostate    • Furuncle of trunk     resolving    • Gastroesophageal reflux disease    • High risk medication use     long term     • History of colonoscopy 12/04/2008    Colon endoscopy 96199 (1)     • History of esophagogastroduodenoscopy 02/19/2010    EGD 52573 (1)     • Hyperlipidemia    • Lateral epicondylitis    • Malaise and fatigue    • Male hypogonadism    • Other long term (current) drug therapy    • Pain in left wrist     unknown etiology    • Pain in lower limb     injection site of testosterone    • Pain in throat    • Restless legs    • Screening for malignant neoplasm of prostate    • Shoulder pain     now improved     • Sleep apnea    • Staphylococcal infectious disease     skin infections - add bactroban to nares    • Tobacco dependence syndrome    • Ureteric stone     rt 2mm stone   • Vertigo        Allergies   Allergen Reactions   • Codeine      Hallucinations   • Penicillins      Other Reaction       Past Surgical History:   Procedure Laterality Date   • INJECTION OF MEDICATION  07/10/2016    Kenalog (2)  -  ULICES Bell   • INJECTION OF MEDICATION  03/16/2012    Toradol (1)  - TORIN Jones   • LAPAROSCOPY ESOPHAGOGASTRIC FUNDOPLASTY HYBRID  03/09/2010    Fundoplasty, laparoscopic (1)   • OTHER SURGICAL HISTORY  04/12/2012    I&D, Simple 96188 (1)  -  DIANE Bernal       History reviewed. No pertinent family history.    Social History     Social History   • Marital status:       Spouse name: N/A   • Number of children: N/A   • Years of education: N/A     Social History Main Topics   • Smoking status: Current Every Day Smoker     Packs/day: 0.50     Types: Electronic Cigarette   • Smokeless tobacco: None      Comment: Current Smoker   • Alcohol use Yes      Comment: social   • Drug use: No   • Sexual activity: Defer     Other Topics Concern   • None     Social History Narrative           Objective   Physical Exam   Constitutional: He is oriented to person, place, and time. He appears well-developed and well-nourished. No distress.   HENT:   Head: Normocephalic and atraumatic.   Mouth/Throat: No oropharyngeal exudate.   Eyes: Conjunctivae and EOM are normal. Pupils are equal, round, and reactive to light. Right eye exhibits no discharge. Left eye exhibits no discharge. No scleral icterus.   Neck: Normal range of motion. Neck supple. No JVD present. No tracheal deviation present. No thyromegaly present.   Cardiovascular: Normal rate, regular rhythm, normal heart sounds and intact distal pulses.  Exam reveals no gallop and no friction rub.    No murmur heard.  Pulmonary/Chest: Effort normal. No stridor. No respiratory distress. He has no wheezes. He has no rales. He exhibits no tenderness.   Abdominal: Soft. He exhibits no distension and no mass. There is no tenderness. There is no rebound and no guarding. No hernia.   Musculoskeletal: Normal range of motion. He exhibits no edema, tenderness or deformity.   Lymphadenopathy:     He has no cervical adenopathy.   Neurological: He is alert and oriented to person, place, and time. He has normal reflexes. He displays normal reflexes. No cranial nerve deficit. He exhibits normal muscle tone. Coordination normal.   Skin: Skin is warm and dry. No rash noted. He is not diaphoretic. No erythema. No pallor.   Psychiatric: He has a normal mood and affect. His behavior is normal. Judgment and thought content normal.   Nursing note and vitals  reviewed.      Procedures        Labs Reviewed   CBC WITH AUTO DIFFERENTIAL - Abnormal; Notable for the following:        Result Value    Immature Grans, Absolute 0.05 (*)     All other components within normal limits   TROPONIN (IN-HOUSE) - Normal   COMPREHENSIVE METABOLIC PANEL - Normal   BNP (IN-HOUSE) - Normal   TROPONIN (IN-HOUSE) - Normal   RAINBOW DRAW    Narrative:     The following orders were created for panel order [place] Draw.  Procedure                               Abnormality         Status                     ---------                               -----------         ------                     Light Blue Top[[id number]]                                   Final result               Green Top (Gel)[[id number]]                                  Final result               Lavender Top[[id number]]                                     Final result               Gold Top - SST[[id number]]                                   Final result                 Please view results for these tests on the individual orders.   CBC AND DIFFERENTIAL    Narrative:     The following orders were created for panel order CBC & Differential.  Procedure                               Abnormality         Status                     ---------                               -----------         ------                     CBC Auto Differential[[id number]]        Abnormal            Final result                 Please view results for these tests on the individual orders.   LIGHT BLUE TOP   GREEN TOP   LAVENDER TOP   GOLD TOP - SST   Xr Chest 2 View    Result Date: 7/11/2017  Narrative: Chest PA and lateral CLINICAL INDICATION: Chest pain. COMPARISON: May 23, 2016 FINDINGS: Cardiac silhouette is normal in size. Pulmonary vascularity is unremarkable. No focal infiltrate or consolidation.  No pleural effusion.  No pneumothorax.     Impression: CONCLUSION: No evidence of active disease. Electronically signed by:  Tani Lundberg MD  7/11/2017 11:42 AM  CDT Workstation: [id number]          ED Course  ED Course    Patient's pain was not relived by nitro. He has had hiatal hernia surgery several years ago as well as severe acid reflux. Pain relieved by GI cocktail.               MDM  Number of Diagnoses or Management Options  Chest wall pain: minor     Amount and/or Complexity of Data Reviewed  Clinical lab tests: reviewed  Tests in the medicine section of CPT®: reviewed    Risk of Complications, Morbidity, and/or Mortality  Presenting problems: minimal  Diagnostic procedures: minimal  Management options: minimal    Patient Progress  Patient progress: improved      Final diagnoses:   Chest wall pain            JUANITO Romeo  07/12/17 1944

## 2017-07-13 NOTE — ED NOTES
Pt is presented to Ed with c/o squeezing chest pain and shortness of air.  Pt states this has been present since Tuesday (3 days ago).  Pt states he was seen in [facility] ED and evaluated for same with discharge.  Pt was being seen by Dr. Dumont, sleep apnea md when he complained of chest pain.  Dr. Dumont is concerned about pt having possible blood clots in his lungs because pt takes testosterone.     Farrah Lauren RN  07/13/17 [id number]

## 2017-07-26 DIAGNOSIS — G47.33 OSA (OBSTRUCTIVE SLEEP APNEA): Primary | ICD-10-CM

## 2017-08-07 ENCOUNTER — CONSULT (OUTPATIENT)
Dept: SURGERY | Facility: CLINIC | Age: 52
End: 2017-08-07

## 2017-08-07 VITALS
DIASTOLIC BLOOD PRESSURE: 60 MMHG | WEIGHT: 224 LBS | BODY MASS INDEX: 38.24 KG/M2 | SYSTOLIC BLOOD PRESSURE: 100 MMHG | HEIGHT: 64 IN

## 2017-08-07 DIAGNOSIS — Z98.890 HISTORY OF FUNDOPLICATION: Primary | ICD-10-CM

## 2017-08-07 PROCEDURE — 99213 OFFICE O/P EST LOW 20 MIN: CPT | Performed by: SURGERY

## 2017-08-07 NOTE — PROGRESS NOTES
Chief Complaint   Patient presents with   • Hernia     Possible hiatus hernia.        HPI  51 year old man referred from the emergency department for a possible hiatus hernia. He began to feel a sudden change with the appearance of chest pain on July the 11. He was seen in the ER on July 13 after visit to Dr Dumont, sleep medicine, with SOB, tightness in chest. There was concern for possible PE and MI-. Troponin negative, CTA negative for PE.  Laparoscopic Nissen performed in 2010.  He has no dysphagia or reflux, though he sometimes feels that food sticks. No weight loss.    Past Medical History:   Diagnosis Date   • Anxiety state    • Asthma    • Disorder of skeletal system    • Dyspnea    • Gastroesophageal reflux disease    • Hyperlipidemia    • Malaise and fatigue    • Male hypogonadism    • Restless legs    • Sleep apnea    • Tobacco dependence syndrome    • Ureteric stone     rt 2mm stone   • Vertigo        Past Surgical History:   Procedure Laterality Date   • COLONOSCOPY     • LAPAROSCOPY ESOPHAGOGASTRIC FUNDOPLASTY HYBRID  03/09/2010    Fundoplasty, laparoscopic (1)         Current Outpatient Prescriptions:   •  dicyclomine (BENTYL) 20 MG tablet, Take 1 tablet by mouth Every 6 (Six) Hours., Disp: 360 tablet, Rfl: 3  •  escitalopram (LEXAPRO) 20 MG tablet, Take 0.5 tablets by mouth Daily., Disp: 90 tablet, Rfl: 3  •  famotidine (PEPCID) 40 MG tablet, Take 1 tablet by mouth Daily., Disp: 30 tablet, Rfl: 11  •  lisinopril (PRINIVIL,ZESTRIL) 20 MG tablet, Take 1 tablet by mouth Daily., Disp: 90 tablet, Rfl: 3  •  omeprazole (PRILOSEC) 20 MG capsule, Take 1 capsule by mouth Daily., Disp: 30 capsule, Rfl: 11  •  Testosterone Cypionate (DEPO-TESTOSTERONE) 200 MG/ML injection, Inject 1 mL into the shoulder, thigh, or buttocks Every 14 (Fourteen) Days. Inject 200 mg (1ML) every week., Disp: 2 mL, Rfl: 5  •  venlafaxine XR (EFFEXOR XR) 75 MG 24 hr capsule, Take 1 capsule by mouth Daily., Disp: 30 capsule, Rfl:  1    Allergies   Allergen Reactions   • Codeine      Hallucinations   • Penicillins      Other Reaction       No family history on file.    Social History     Social History   • Marital status:      Social History Main Topics   • Smoking status: Current Every Day Smoker     Packs/day: 0.50     Types: Electronic Cigarette   • Smokeless tobacco: Not on file      Comment: Current Smoker   • Alcohol use Yes      Comment: social   • Drug use: No       Review of Systems  Nothing else to add  Physical Exam   Constitutional: He is oriented to person, place, and time. He appears well-developed and well-nourished.   HENT:   Head: Normocephalic and atraumatic.   Eyes: EOM are normal. Pupils are equal, round, and reactive to light.   Neck: Normal range of motion. Neck supple. No tracheal deviation present. No thyromegaly present.   Cardiovascular: Normal rate and regular rhythm.    Pulmonary/Chest: Effort normal and breath sounds normal.   Abdominal: Soft. Bowel sounds are normal. He exhibits no distension. There is no tenderness.   Lymphadenopathy:     He has no cervical adenopathy.   Neurological: He is alert and oriented to person, place, and time.   Skin: Skin is warm and dry. No erythema.   Psychiatric: He has a normal mood and affect. His behavior is normal. Judgment and thought content normal.   Vitals reviewed.        ASSESSMENT    Rob was seen today for hernia.    Diagnoses and all orders for this visit:    History of fundoplication      PLAN    1. No surgical issues noted  2. Will recheck in 1 month          This document has been electronically signed by Juan Aceves MD on August 13, 2017 1:44 PM

## 2017-09-19 RX ORDER — VENLAFAXINE HYDROCHLORIDE 75 MG/1
CAPSULE, EXTENDED RELEASE ORAL
Qty: 30 CAPSULE | Refills: 11 | Status: SHIPPED | OUTPATIENT
Start: 2017-09-19 | End: 2018-10-06 | Stop reason: SDUPTHER

## 2018-03-27 RX ORDER — LISINOPRIL 20 MG/1
20 TABLET ORAL DAILY
Qty: 30 TABLET | Refills: 11 | Status: SHIPPED | OUTPATIENT
Start: 2018-03-27 | End: 2019-05-01 | Stop reason: SDUPTHER

## 2018-04-05 ENCOUNTER — TELEPHONE (OUTPATIENT)
Dept: FAMILY MEDICINE CLINIC | Facility: CLINIC | Age: 53
End: 2018-04-05

## 2018-04-05 RX ORDER — DICYCLOMINE HCL 20 MG
20 TABLET ORAL EVERY 6 HOURS
Qty: 360 TABLET | Refills: 3 | Status: SHIPPED | OUTPATIENT
Start: 2018-04-05 | End: 2019-05-06 | Stop reason: SDUPTHER

## 2018-07-21 DIAGNOSIS — F33.1 MODERATE EPISODE OF RECURRENT MAJOR DEPRESSIVE DISORDER (HCC): ICD-10-CM

## 2018-07-23 RX ORDER — ESCITALOPRAM OXALATE 20 MG/1
TABLET ORAL
Qty: 15 TABLET | Refills: 11 | Status: SHIPPED | OUTPATIENT
Start: 2018-07-23 | End: 2019-04-11

## 2018-08-14 ENCOUNTER — OFFICE VISIT (OUTPATIENT)
Dept: FAMILY MEDICINE CLINIC | Facility: CLINIC | Age: 53
End: 2018-08-14

## 2018-08-14 VITALS
HEART RATE: 91 BPM | OXYGEN SATURATION: 98 % | TEMPERATURE: 99 F | SYSTOLIC BLOOD PRESSURE: 122 MMHG | DIASTOLIC BLOOD PRESSURE: 72 MMHG

## 2018-08-14 DIAGNOSIS — R53.83 OTHER FATIGUE: ICD-10-CM

## 2018-08-14 DIAGNOSIS — R25.2 MUSCLE CRAMPS: ICD-10-CM

## 2018-08-14 DIAGNOSIS — M79.602 LEFT ARM PAIN: Primary | ICD-10-CM

## 2018-08-14 PROCEDURE — 82728 ASSAY OF FERRITIN: CPT | Performed by: FAMILY MEDICINE

## 2018-08-14 PROCEDURE — 36415 COLL VENOUS BLD VENIPUNCTURE: CPT | Performed by: FAMILY MEDICINE

## 2018-08-14 PROCEDURE — 82746 ASSAY OF FOLIC ACID SERUM: CPT | Performed by: FAMILY MEDICINE

## 2018-08-14 PROCEDURE — 93005 ELECTROCARDIOGRAM TRACING: CPT | Performed by: FAMILY MEDICINE

## 2018-08-14 PROCEDURE — 82607 VITAMIN B-12: CPT | Performed by: FAMILY MEDICINE

## 2018-08-14 PROCEDURE — 80048 BASIC METABOLIC PNL TOTAL CA: CPT | Performed by: FAMILY MEDICINE

## 2018-08-14 PROCEDURE — 99213 OFFICE O/P EST LOW 20 MIN: CPT | Performed by: FAMILY MEDICINE

## 2018-08-14 PROCEDURE — 93010 ELECTROCARDIOGRAM REPORT: CPT | Performed by: INTERNAL MEDICINE

## 2018-08-14 PROCEDURE — 85027 COMPLETE CBC AUTOMATED: CPT | Performed by: FAMILY MEDICINE

## 2018-08-14 NOTE — PROGRESS NOTES
Subjective   Rob Josh Serrato is a 52 y.o. male.     History of Present Illness     Headache 1 week that wont go away.  Annoying.  Not throbbing or pounding.  Forehead.  Left arm and shoulder tingling.  No neck pain.  Working at [employer], getting dehydrated and having muscle cramps back and legs.  Taking otc potassium  Marital problems, stress.     Review of Systems   Constitutional: Positive for fatigue. Negative for chills and fever.   HENT: Negative for congestion, ear discharge, ear pain, facial swelling, hearing loss, postnasal drip, rhinorrhea, sinus pressure, sore throat, trouble swallowing and voice change.    Eyes: Negative for discharge, redness and visual disturbance.   Respiratory: Negative for cough, chest tightness, shortness of breath and wheezing.    Cardiovascular: Negative for chest pain and palpitations.   Gastrointestinal: Negative for abdominal pain, blood in stool, constipation, diarrhea, nausea and vomiting.   Endocrine: Negative for polydipsia and polyuria.   Genitourinary: Negative for dysuria, flank pain, hematuria and urgency.   Musculoskeletal: Positive for myalgias. Negative for arthralgias, back pain and joint swelling.   Skin: Negative for rash.   Neurological: Positive for numbness. Negative for dizziness, weakness and headaches.   Hematological: Negative for adenopathy.   Psychiatric/Behavioral: Negative for confusion and sleep disturbance. The patient is not nervous/anxious.            /72 (BP Location: Right arm, Patient Position: Sitting, Cuff Size: Adult)   Pulse 91   Temp 99 °F (37.2 °C) (Temporal Artery )   SpO2 98%       Objective     Physical Exam   Constitutional: He is oriented to person, place, and time. He appears well-developed and well-nourished.   HENT:   Head: Normocephalic and atraumatic.   Right Ear: External ear normal.   Left Ear: External ear normal.   Nose: Nose normal.   Eyes: Pupils are equal, round, and reactive to light. Conjunctivae and EOM are  normal.   Neck: Normal range of motion.   Pulmonary/Chest: Effort normal.   Musculoskeletal: Normal range of motion.   Neurological: He is alert and oriented to person, place, and time.   Psychiatric: He has a normal mood and affect. His behavior is normal. Judgment and thought content normal.   Nursing note and vitals reviewed.          PAST MEDICAL HISTORY     Past Medical History:   Diagnosis Date   • Anxiety state    • Asthma    • Disorder of skeletal system    • Dyspnea    • Gastroesophageal reflux disease    • Hyperlipidemia    • Malaise and fatigue    • Male hypogonadism    • Restless legs    • Sleep apnea    • Tobacco dependence syndrome    • Ureteric stone     rt 2mm stone   • Vertigo       PAST SURGICAL HISTORY     Past Surgical History:   Procedure Laterality Date   • COLONOSCOPY     • LAPAROSCOPY ESOPHAGOGASTRIC FUNDOPLASTY HYBRID  03/09/2010    Fundoplasty, laparoscopic (1)      SOCIAL HISTORY     Social History     Social History   • Marital status:      Social History Main Topics   • Smoking status: Current Every Day Smoker     Packs/day: 0.50     Types: Electronic Cigarette      Comment: Current Smoker   • Alcohol use Yes      Comment: social   • Drug use: No   • Sexual activity: Defer     Other Topics Concern   • Not on file      ALLERGIES   Codeine and Penicillins   MEDICATIONS     Current Outpatient Prescriptions   Medication Sig Dispense Refill   • dicyclomine (BENTYL) 20 MG tablet Take 1 tablet by mouth Every 6 (Six) Hours. 360 tablet 3   • escitalopram (LEXAPRO) 20 MG tablet TAKE 1/2 TABLET BY MOUTH EVERY DAY 15 tablet 11   • famotidine (PEPCID) 40 MG tablet Take 1 tablet by mouth Daily. 30 tablet 11   • lisinopril (PRINIVIL,ZESTRIL) 20 MG tablet TAKE 1 TABLET BY MOUTH DAILY. 30 tablet 11   • omeprazole (PRILOSEC) 20 MG capsule Take 1 capsule by mouth Daily. 30 capsule 11   • Testosterone Cypionate (DEPO-TESTOSTERONE) 200 MG/ML injection Inject 1 mL into the shoulder, thigh, or buttocks  Every 14 (Fourteen) Days. Inject 200 mg (1ML) every week. 2 mL 5   • venlafaxine XR (EFFEXOR-XR) 75 MG 24 hr capsule TAKE 1 CAPSULE BY MOUTH DAILY. 30 capsule 11     No current facility-administered medications for this visit.         The following portions of the patient's history were reviewed and updated as appropriate: allergies, current medications, past family history, past medical history, past social history, past surgical history and problem list.        Assessment/Plan   Diagnoses and all orders for this visit:    Left arm pain  -     ECG 12 Lead    Muscle cramps  -     Basic Metabolic Panel  -     CBC (No Diff)    Other fatigue  -     Basic Metabolic Panel  -     CBC (No Diff)    ekg normal  Will call with labs.  I think needs to rest and stay hydrated better.  He doesn't remember urinating during work 2 days in a row.   Headache may be due to sodium changes.                      No Follow-up on file.                  This document has been electronically signed by Cb Weaver MD on August 14, 2018 1:16 PM

## 2018-08-15 ENCOUNTER — TELEPHONE (OUTPATIENT)
Dept: FAMILY MEDICINE CLINIC | Facility: CLINIC | Age: 53
End: 2018-08-15

## 2018-08-15 DIAGNOSIS — D64.9 ANEMIA, UNSPECIFIED TYPE: Primary | ICD-10-CM

## 2018-08-15 LAB
ANION GAP SERPL CALCULATED.3IONS-SCNC: 10 MMOL/L (ref 5–15)
BUN BLD-MCNC: 19 MG/DL (ref 7–21)
BUN/CREAT SERPL: 17.4 (ref 7–25)
CALCIUM SPEC-SCNC: 9.5 MG/DL (ref 8.4–10.2)
CHLORIDE SERPL-SCNC: 102 MMOL/L (ref 95–110)
CO2 SERPL-SCNC: 27 MMOL/L (ref 22–31)
CREAT BLD-MCNC: 1.09 MG/DL (ref 0.7–1.3)
DEPRECATED RDW RBC AUTO: 42.4 FL (ref 35.1–43.9)
ERYTHROCYTE [DISTWIDTH] IN BLOOD BY AUTOMATED COUNT: 13.7 % (ref 11.5–14.5)
FERRITIN SERPL-MCNC: 121 NG/ML (ref 17.9–464)
FOLATE SERPL-MCNC: 3.59 NG/ML (ref 2.76–21)
GFR SERPL CREATININE-BSD FRML MDRD: 71 ML/MIN/1.73 (ref 56–130)
GLUCOSE BLD-MCNC: 83 MG/DL (ref 60–100)
HCT VFR BLD AUTO: 40 % (ref 39–49)
HGB BLD-MCNC: 13.4 G/DL (ref 13.7–17.3)
MCH RBC QN AUTO: 28.6 PG (ref 26.5–34)
MCHC RBC AUTO-ENTMCNC: 33.5 G/DL (ref 31.5–36.3)
MCV RBC AUTO: 85.5 FL (ref 80–98)
PLATELET # BLD AUTO: 337 10*3/MM3 (ref 150–450)
PMV BLD AUTO: 11.4 FL (ref 8–12)
POTASSIUM BLD-SCNC: 5.1 MMOL/L (ref 3.5–5.1)
RBC # BLD AUTO: 4.68 10*6/MM3 (ref 4.37–5.74)
SODIUM BLD-SCNC: 139 MMOL/L (ref 137–145)
VIT B12 BLD-MCNC: 187 PG/ML (ref 239–931)
WBC NRBC COR # BLD: 7.29 10*3/MM3 (ref 3.2–9.8)

## 2018-08-15 NOTE — TELEPHONE ENCOUNTER
----- Message from Cb Weaver MD sent at 8/15/2018  4:18 PM CDT -----  b12 is low, that is why tingling and mild anemia.  Needs b12 shots weekly x 4 then monthly for life.

## 2018-08-16 ENCOUNTER — CLINICAL SUPPORT (OUTPATIENT)
Dept: FAMILY MEDICINE CLINIC | Facility: CLINIC | Age: 53
End: 2018-08-16

## 2018-08-16 DIAGNOSIS — D51.9 ANEMIA DUE TO VITAMIN B12 DEFICIENCY, UNSPECIFIED B12 DEFICIENCY TYPE: Primary | ICD-10-CM

## 2018-08-16 PROCEDURE — 96372 THER/PROPH/DIAG INJ SC/IM: CPT | Performed by: FAMILY MEDICINE

## 2018-08-16 RX ORDER — CYANOCOBALAMIN 1000 UG/ML
1000 INJECTION, SOLUTION INTRAMUSCULAR; SUBCUTANEOUS
Status: SHIPPED | OUTPATIENT
Start: 2018-08-16

## 2018-08-16 RX ADMIN — CYANOCOBALAMIN 1000 MCG: 1000 INJECTION, SOLUTION INTRAMUSCULAR; SUBCUTANEOUS at 08:54

## 2018-08-23 ENCOUNTER — CLINICAL SUPPORT (OUTPATIENT)
Dept: FAMILY MEDICINE CLINIC | Facility: CLINIC | Age: 53
End: 2018-08-23

## 2018-08-23 DIAGNOSIS — D51.9 ANEMIA DUE TO VITAMIN B12 DEFICIENCY, UNSPECIFIED B12 DEFICIENCY TYPE: ICD-10-CM

## 2018-08-23 PROCEDURE — 96372 THER/PROPH/DIAG INJ SC/IM: CPT | Performed by: FAMILY MEDICINE

## 2018-08-23 RX ADMIN — CYANOCOBALAMIN 1000 MCG: 1000 INJECTION, SOLUTION INTRAMUSCULAR; SUBCUTANEOUS at 08:13

## 2018-08-30 ENCOUNTER — CLINICAL SUPPORT (OUTPATIENT)
Dept: FAMILY MEDICINE CLINIC | Facility: CLINIC | Age: 53
End: 2018-08-30

## 2018-08-30 DIAGNOSIS — D51.9 ANEMIA DUE TO VITAMIN B12 DEFICIENCY, UNSPECIFIED B12 DEFICIENCY TYPE: Primary | ICD-10-CM

## 2018-08-30 PROCEDURE — 96372 THER/PROPH/DIAG INJ SC/IM: CPT | Performed by: FAMILY MEDICINE

## 2018-08-30 RX ADMIN — CYANOCOBALAMIN 1000 MCG: 1000 INJECTION, SOLUTION INTRAMUSCULAR; SUBCUTANEOUS at 09:50

## 2018-09-06 ENCOUNTER — CLINICAL SUPPORT (OUTPATIENT)
Dept: FAMILY MEDICINE CLINIC | Facility: CLINIC | Age: 53
End: 2018-09-06

## 2018-09-06 DIAGNOSIS — D51.9 ANEMIA DUE TO VITAMIN B12 DEFICIENCY, UNSPECIFIED B12 DEFICIENCY TYPE: ICD-10-CM

## 2018-09-06 PROCEDURE — 96372 THER/PROPH/DIAG INJ SC/IM: CPT | Performed by: FAMILY MEDICINE

## 2018-09-06 RX ADMIN — CYANOCOBALAMIN 1000 MCG: 1000 INJECTION, SOLUTION INTRAMUSCULAR; SUBCUTANEOUS at 09:47

## 2018-10-06 RX ORDER — VENLAFAXINE HYDROCHLORIDE 75 MG/1
CAPSULE, EXTENDED RELEASE ORAL
Qty: 30 CAPSULE | Refills: 11 | Status: SHIPPED | OUTPATIENT
Start: 2018-10-06 | End: 2019-04-11 | Stop reason: SDUPTHER

## 2019-02-20 ENCOUNTER — OFFICE VISIT (OUTPATIENT)
Dept: FAMILY MEDICINE CLINIC | Facility: CLINIC | Age: 54
End: 2019-02-20

## 2019-02-20 ENCOUNTER — APPOINTMENT (OUTPATIENT)
Dept: LAB | Facility: HOSPITAL | Age: 54
End: 2019-02-20

## 2019-02-20 VITALS
WEIGHT: 214.2 LBS | BODY MASS INDEX: 36.57 KG/M2 | OXYGEN SATURATION: 98 % | HEART RATE: 78 BPM | DIASTOLIC BLOOD PRESSURE: 74 MMHG | SYSTOLIC BLOOD PRESSURE: 118 MMHG | TEMPERATURE: 98.7 F | HEIGHT: 64 IN

## 2019-02-20 DIAGNOSIS — E53.8 B12 DEFICIENCY: ICD-10-CM

## 2019-02-20 DIAGNOSIS — E29.1 MALE HYPOGONADISM: Primary | ICD-10-CM

## 2019-02-20 DIAGNOSIS — R42 VERTIGO: ICD-10-CM

## 2019-02-20 LAB
ALBUMIN SERPL-MCNC: 5 G/DL (ref 3.4–4.8)
ALBUMIN/GLOB SERPL: 1.9 G/DL (ref 1.1–1.8)
ALP SERPL-CCNC: 85 U/L (ref 38–126)
ALT SERPL W P-5'-P-CCNC: 13 U/L (ref 21–72)
ANION GAP SERPL CALCULATED.3IONS-SCNC: 13 MMOL/L (ref 5–15)
AST SERPL-CCNC: 17 U/L (ref 17–59)
BILIRUB SERPL-MCNC: 0.7 MG/DL (ref 0.2–1.3)
BUN BLD-MCNC: 16 MG/DL (ref 7–21)
BUN/CREAT SERPL: 19.8 (ref 7–25)
CALCIUM SPEC-SCNC: 10.6 MG/DL (ref 8.4–10.2)
CHLORIDE SERPL-SCNC: 96 MMOL/L (ref 95–110)
CO2 SERPL-SCNC: 26 MMOL/L (ref 22–31)
CREAT BLD-MCNC: 0.81 MG/DL (ref 0.7–1.3)
DEPRECATED RDW RBC AUTO: 40 FL (ref 37–54)
ERYTHROCYTE [DISTWIDTH] IN BLOOD BY AUTOMATED COUNT: 13.2 % (ref 12.3–15.4)
GFR SERPL CREATININE-BSD FRML MDRD: 100 ML/MIN/1.73 (ref 56–130)
GLOBULIN UR ELPH-MCNC: 2.7 GM/DL (ref 2.3–3.5)
GLUCOSE BLD-MCNC: 83 MG/DL (ref 60–100)
HCT VFR BLD AUTO: 42.3 % (ref 37.5–51)
HGB BLD-MCNC: 14.1 G/DL (ref 13–17.7)
MCH RBC QN AUTO: 27.9 PG (ref 26.6–33)
MCHC RBC AUTO-ENTMCNC: 33.3 G/DL (ref 31.5–35.7)
MCV RBC AUTO: 83.6 FL (ref 79–97)
PLATELET # BLD AUTO: 303 10*3/MM3 (ref 140–450)
PMV BLD AUTO: 11.2 FL (ref 6–12)
POTASSIUM BLD-SCNC: 4.3 MMOL/L (ref 3.5–5.1)
PROT SERPL-MCNC: 7.7 G/DL (ref 6.3–8.6)
RBC # BLD AUTO: 5.06 10*6/MM3 (ref 4.14–5.8)
SODIUM BLD-SCNC: 135 MMOL/L (ref 137–145)
TSH SERPL DL<=0.05 MIU/L-ACNC: 1.55 MIU/ML (ref 0.46–4.68)
VIT B12 BLD-MCNC: 220 PG/ML (ref 239–931)
WBC NRBC COR # BLD: 10.16 10*3/MM3 (ref 3.4–10.8)

## 2019-02-20 PROCEDURE — 80053 COMPREHEN METABOLIC PANEL: CPT | Performed by: FAMILY MEDICINE

## 2019-02-20 PROCEDURE — 84443 ASSAY THYROID STIM HORMONE: CPT | Performed by: FAMILY MEDICINE

## 2019-02-20 PROCEDURE — 85027 COMPLETE CBC AUTOMATED: CPT | Performed by: FAMILY MEDICINE

## 2019-02-20 PROCEDURE — 99214 OFFICE O/P EST MOD 30 MIN: CPT | Performed by: FAMILY MEDICINE

## 2019-02-20 PROCEDURE — 84403 ASSAY OF TOTAL TESTOSTERONE: CPT | Performed by: FAMILY MEDICINE

## 2019-02-20 PROCEDURE — 82607 VITAMIN B-12: CPT | Performed by: FAMILY MEDICINE

## 2019-02-20 RX ORDER — MECLIZINE HYDROCHLORIDE 25 MG/1
25 TABLET ORAL 3 TIMES DAILY PRN
Qty: 90 TABLET | Refills: 0 | Status: SHIPPED | OUTPATIENT
Start: 2019-02-20 | End: 2019-10-29 | Stop reason: SDUPTHER

## 2019-02-20 NOTE — PATIENT INSTRUCTIONS

## 2019-02-20 NOTE — PROGRESS NOTES
" Subjective   Rob Josh Serrato is a 53 y.o. male.     History of Present Illness     Wife made him come to be checked for depression.  He says he is not depressed.  He just wants out of his current marriage.   He is looking for apartment.  Has new large tattoo right anterior right forearm, he says now 1 of 5  No testosterone since October  3 weeks, dizzy spells.  Room will start to spin but never spins.   Dizzy almost to point of nausea.  May last a minute but happed 9 to 10 times in one day.    Review of Systems   Constitutional: Negative for chills, fatigue and fever.   HENT: Negative for congestion, ear discharge, ear pain, facial swelling, hearing loss, postnasal drip, rhinorrhea, sinus pressure, sore throat, trouble swallowing and voice change.    Eyes: Negative for discharge, redness and visual disturbance.   Respiratory: Negative for cough, chest tightness, shortness of breath and wheezing.    Cardiovascular: Negative for chest pain and palpitations.   Gastrointestinal: Negative for abdominal pain, blood in stool, constipation, diarrhea, nausea and vomiting.   Endocrine: Negative for polydipsia and polyuria.   Genitourinary: Negative for dysuria, flank pain, hematuria and urgency.   Musculoskeletal: Negative for arthralgias, back pain, joint swelling and myalgias.   Skin: Negative for rash.   Neurological: Positive for dizziness and headaches. Negative for weakness and numbness.   Hematological: Negative for adenopathy.   Psychiatric/Behavioral: Negative for confusion and sleep disturbance. The patient is nervous/anxious.            /74 (BP Location: Left arm, Patient Position: Sitting, Cuff Size: Adult)   Pulse 78   Temp 98.7 °F (37.1 °C)   Ht 162.6 cm (64.02\")   Wt 97.2 kg (214 lb 3.2 oz)   SpO2 98%   BMI 36.75 kg/m²       Objective     Physical Exam   Constitutional: He is oriented to person, place, and time. He appears well-developed and well-nourished.   HENT:   Head: Normocephalic and " atraumatic.   Right Ear: External ear normal.   Left Ear: External ear normal.   Nose: Nose normal.   Eyes: Conjunctivae and EOM are normal. Pupils are equal, round, and reactive to light.   Neck: Normal range of motion.   Pulmonary/Chest: Effort normal.   Musculoskeletal: Normal range of motion.   Neurological: He is alert and oriented to person, place, and time.   Psychiatric: He has a normal mood and affect. His behavior is normal. Judgment and thought content normal.   Nursing note and vitals reviewed.          PAST MEDICAL HISTORY     Past Medical History:   Diagnosis Date   • Anxiety state    • Asthma    • Disorder of skeletal system    • Dyspnea    • Gastroesophageal reflux disease    • Hyperlipidemia    • Malaise and fatigue    • Male hypogonadism    • Restless legs    • Sleep apnea    • Tobacco dependence syndrome    • Ureteric stone     rt 2mm stone   • Vertigo       PAST SURGICAL HISTORY     Past Surgical History:   Procedure Laterality Date   • COLONOSCOPY     • LAPAROSCOPY ESOPHAGOGASTRIC FUNDOPLASTY HYBRID  03/09/2010    Fundoplasty, laparoscopic (1)      SOCIAL HISTORY     Social History     Socioeconomic History   • Marital status:      Spouse name: Not on file   • Number of children: Not on file   • Years of education: Not on file   • Highest education level: Not on file   Tobacco Use   • Smoking status: Current Every Day Smoker     Packs/day: 0.50     Types: Electronic Cigarette   • Tobacco comment: Current Smoker   Substance and Sexual Activity   • Alcohol use: Yes     Comment: social   • Drug use: No   • Sexual activity: Defer      ALLERGIES   Codeine and Penicillins   MEDICATIONS     Current Outpatient Medications   Medication Sig Dispense Refill   • dicyclomine (BENTYL) 20 MG tablet Take 1 tablet by mouth Every 6 (Six) Hours. 360 tablet 3   • escitalopram (LEXAPRO) 20 MG tablet TAKE 1/2 TABLET BY MOUTH EVERY DAY 15 tablet 11   • lisinopril (PRINIVIL,ZESTRIL) 20 MG tablet TAKE 1 TABLET BY  MOUTH DAILY. 30 tablet 11   • Testosterone Cypionate (DEPO-TESTOSTERONE) 200 MG/ML injection Inject 1 mL into the shoulder, thigh, or buttocks Every 14 (Fourteen) Days. Inject 200 mg (1ML) every week. 2 mL 5   • venlafaxine XR (EFFEXOR-XR) 75 MG 24 hr capsule TAKE 1 CAPSULE BY MOUTH DAILY. 30 capsule 11   • meclizine (ANTIVERT) 25 MG tablet Take 1 tablet by mouth 3 (Three) Times a Day As Needed for dizziness. 90 tablet 0     Current Facility-Administered Medications   Medication Dose Route Frequency Provider Last Rate Last Dose   • cyanocobalamin injection 1,000 mcg  1,000 mcg Intramuscular Q28 Days Cb Weaver MD   1,000 mcg at 09/06/18 0947        The following portions of the patient's history were reviewed and updated as appropriate: allergies, current medications, past family history, past medical history, past social history, past surgical history and problem list.        Assessment/Plan   Rob was seen today for depression.    Diagnoses and all orders for this visit:    Male hypogonadism  -     Testosterone    B12 deficiency  -     Vitamin B12    Vertigo  -     CBC (No Diff)  -     Comprehensive Metabolic Panel  -     TSH    Other orders  -     meclizine (ANTIVERT) 25 MG tablet; Take 1 tablet by mouth 3 (Three) Times a Day As Needed for dizziness.      I would like to cut the lexapro back but not yet. Consider cutting back and increasing effexor xr to 150mg.      Will check labs.     Warned drowsiness with meclizine                     No Follow-up on file.                  This document has been electronically signed by Cb Weaver MD on February 20, 2019 9:26 AM

## 2019-02-21 LAB — TESTOST SERPL-MCNC: 222 NG/DL (ref 264–916)

## 2019-02-25 RX ORDER — TESTOSTERONE CYPIONATE 200 MG/ML
200 INJECTION, SOLUTION INTRAMUSCULAR
Qty: 2 ML | Refills: 5 | Status: SHIPPED | OUTPATIENT
Start: 2019-02-25 | End: 2019-11-12 | Stop reason: SDUPTHER

## 2019-02-27 ENCOUNTER — CLINICAL SUPPORT (OUTPATIENT)
Dept: FAMILY MEDICINE CLINIC | Facility: CLINIC | Age: 54
End: 2019-02-27

## 2019-02-27 DIAGNOSIS — D51.9 ANEMIA DUE TO VITAMIN B12 DEFICIENCY, UNSPECIFIED B12 DEFICIENCY TYPE: ICD-10-CM

## 2019-02-27 PROCEDURE — 96372 THER/PROPH/DIAG INJ SC/IM: CPT | Performed by: FAMILY MEDICINE

## 2019-02-27 RX ADMIN — CYANOCOBALAMIN 1000 MCG: 1000 INJECTION, SOLUTION INTRAMUSCULAR; SUBCUTANEOUS at 09:53

## 2019-03-07 ENCOUNTER — CLINICAL SUPPORT (OUTPATIENT)
Dept: FAMILY MEDICINE CLINIC | Facility: CLINIC | Age: 54
End: 2019-03-07

## 2019-03-07 DIAGNOSIS — D51.9 ANEMIA DUE TO VITAMIN B12 DEFICIENCY, UNSPECIFIED B12 DEFICIENCY TYPE: ICD-10-CM

## 2019-03-07 PROCEDURE — 96372 THER/PROPH/DIAG INJ SC/IM: CPT | Performed by: FAMILY MEDICINE

## 2019-03-07 RX ADMIN — CYANOCOBALAMIN 1000 MCG: 1000 INJECTION, SOLUTION INTRAMUSCULAR; SUBCUTANEOUS at 12:07

## 2019-03-18 ENCOUNTER — CLINICAL SUPPORT (OUTPATIENT)
Dept: FAMILY MEDICINE CLINIC | Facility: CLINIC | Age: 54
End: 2019-03-18

## 2019-03-18 DIAGNOSIS — D51.9 ANEMIA DUE TO VITAMIN B12 DEFICIENCY, UNSPECIFIED B12 DEFICIENCY TYPE: ICD-10-CM

## 2019-03-18 PROCEDURE — 96372 THER/PROPH/DIAG INJ SC/IM: CPT | Performed by: FAMILY MEDICINE

## 2019-03-18 RX ADMIN — CYANOCOBALAMIN 1000 MCG: 1000 INJECTION, SOLUTION INTRAMUSCULAR; SUBCUTANEOUS at 09:01

## 2019-03-26 ENCOUNTER — CLINICAL SUPPORT (OUTPATIENT)
Dept: FAMILY MEDICINE CLINIC | Facility: CLINIC | Age: 54
End: 2019-03-26

## 2019-03-26 DIAGNOSIS — D51.9 ANEMIA DUE TO VITAMIN B12 DEFICIENCY, UNSPECIFIED B12 DEFICIENCY TYPE: Primary | ICD-10-CM

## 2019-03-26 PROCEDURE — 96372 THER/PROPH/DIAG INJ SC/IM: CPT | Performed by: FAMILY MEDICINE

## 2019-03-26 RX ADMIN — CYANOCOBALAMIN 1000 MCG: 1000 INJECTION, SOLUTION INTRAMUSCULAR; SUBCUTANEOUS at 09:57

## 2019-04-11 ENCOUNTER — APPOINTMENT (OUTPATIENT)
Dept: LAB | Facility: HOSPITAL | Age: 54
End: 2019-04-11

## 2019-04-11 ENCOUNTER — OFFICE VISIT (OUTPATIENT)
Dept: FAMILY MEDICINE CLINIC | Facility: CLINIC | Age: 54
End: 2019-04-11

## 2019-04-11 VITALS
TEMPERATURE: 98.9 F | BODY MASS INDEX: 36.81 KG/M2 | HEART RATE: 101 BPM | HEIGHT: 64 IN | OXYGEN SATURATION: 98 % | WEIGHT: 215.6 LBS | SYSTOLIC BLOOD PRESSURE: 130 MMHG | DIASTOLIC BLOOD PRESSURE: 80 MMHG

## 2019-04-11 DIAGNOSIS — E83.52 HYPERCALCEMIA: ICD-10-CM

## 2019-04-11 DIAGNOSIS — F33.1 MODERATE EPISODE OF RECURRENT MAJOR DEPRESSIVE DISORDER (HCC): Primary | ICD-10-CM

## 2019-04-11 DIAGNOSIS — E53.8 B12 DEFICIENCY: ICD-10-CM

## 2019-04-11 PROCEDURE — 99214 OFFICE O/P EST MOD 30 MIN: CPT | Performed by: FAMILY MEDICINE

## 2019-04-11 PROCEDURE — 83970 ASSAY OF PARATHORMONE: CPT | Performed by: FAMILY MEDICINE

## 2019-04-11 PROCEDURE — 82607 VITAMIN B-12: CPT | Performed by: FAMILY MEDICINE

## 2019-04-11 RX ORDER — VENLAFAXINE HYDROCHLORIDE 75 MG/1
150 CAPSULE, EXTENDED RELEASE ORAL DAILY
Qty: 60 CAPSULE | Refills: 11 | Status: SHIPPED | OUTPATIENT
Start: 2019-04-11 | End: 2020-04-15

## 2019-04-11 NOTE — PATIENT INSTRUCTIONS

## 2019-04-11 NOTE — PROGRESS NOTES
" Subjective   Rob Josh Serrato is a 53 y.o. male.     History of Present Illness     Depression meds not working.  Anhedonia. Working 3rd shift. Hasn't restarted testosterone.  On lexapro 10mg and effexor xr 75mg qd.  lexapro at night helps him sleep.  His night.   Thought of dying, etc.  Not suicidal but doesn't care.  Very little caffeine  Relationship at home not any better.       Review of Systems   Constitutional: Negative for chills, fatigue and fever.   HENT: Negative for congestion, ear discharge, ear pain, facial swelling, hearing loss, postnasal drip, rhinorrhea, sinus pressure, sore throat, trouble swallowing and voice change.    Eyes: Negative for discharge, redness and visual disturbance.   Respiratory: Negative for cough, chest tightness, shortness of breath and wheezing.    Cardiovascular: Negative for chest pain and palpitations.   Gastrointestinal: Negative for abdominal pain, blood in stool, constipation, diarrhea, nausea and vomiting.   Endocrine: Negative for polydipsia and polyuria.   Genitourinary: Negative for dysuria, flank pain, hematuria and urgency.   Musculoskeletal: Negative for arthralgias, back pain, joint swelling and myalgias.   Skin: Negative for rash.   Neurological: Positive for dizziness. Negative for weakness, numbness and headaches.   Hematological: Negative for adenopathy.   Psychiatric/Behavioral: Positive for sleep disturbance. Negative for confusion. The patient is nervous/anxious.            /80 (BP Location: Left arm, Patient Position: Sitting, Cuff Size: Adult)   Pulse 101   Temp 98.9 °F (37.2 °C) (Temporal)   Ht 162.6 cm (64.02\")   Wt 97.8 kg (215 lb 9.6 oz)   SpO2 98%   BMI 36.99 kg/m²       Objective     Physical Exam   Constitutional: He is oriented to person, place, and time. He appears well-developed and well-nourished.   HENT:   Head: Normocephalic and atraumatic.   Right Ear: External ear normal.   Left Ear: External ear normal.   Nose: Nose normal. "   Eyes: Conjunctivae and EOM are normal. Pupils are equal, round, and reactive to light.   Neck: Normal range of motion.   Pulmonary/Chest: Effort normal.   Musculoskeletal: Normal range of motion.   Neurological: He is alert and oriented to person, place, and time.   Psychiatric: He has a normal mood and affect. His behavior is normal. Judgment and thought content normal.   Nursing note and vitals reviewed.          PAST MEDICAL HISTORY     Past Medical History:   Diagnosis Date   • Anxiety state    • Asthma    • Disorder of skeletal system    • Dyspnea    • Gastroesophageal reflux disease    • Hyperlipidemia    • Malaise and fatigue    • Male hypogonadism    • Restless legs    • Sleep apnea    • Tobacco dependence syndrome    • Ureteric stone     rt 2mm stone   • Vertigo       PAST SURGICAL HISTORY     Past Surgical History:   Procedure Laterality Date   • COLONOSCOPY     • LAPAROSCOPY ESOPHAGOGASTRIC FUNDOPLASTY HYBRID  03/09/2010    Fundoplasty, laparoscopic (1)      SOCIAL HISTORY     Social History     Socioeconomic History   • Marital status:      Spouse name: Not on file   • Number of children: Not on file   • Years of education: Not on file   • Highest education level: Not on file   Tobacco Use   • Smoking status: Current Every Day Smoker     Packs/day: 0.50     Types: Electronic Cigarette   • Smokeless tobacco: Never Used   • Tobacco comment: Current Smoker   Substance and Sexual Activity   • Alcohol use: Yes     Comment: social   • Drug use: No   • Sexual activity: Defer      ALLERGIES   Codeine and Penicillins   MEDICATIONS     Current Outpatient Medications   Medication Sig Dispense Refill   • dicyclomine (BENTYL) 20 MG tablet Take 1 tablet by mouth Every 6 (Six) Hours. 360 tablet 3   • lisinopril (PRINIVIL,ZESTRIL) 20 MG tablet TAKE 1 TABLET BY MOUTH DAILY. 30 tablet 11   • meclizine (ANTIVERT) 25 MG tablet Take 1 tablet by mouth 3 (Three) Times a Day As Needed for dizziness. 90 tablet 0   •  venlafaxine XR (EFFEXOR-XR) 75 MG 24 hr capsule Take 2 capsules by mouth Daily. 60 capsule 11   • Testosterone Cypionate (DEPO-TESTOSTERONE) 200 MG/ML injection Inject 1 mL into the appropriate muscle as directed by prescriber Every 14 (Fourteen) Days. Inject 200 mg (1ML) every week. 2 mL 5     Current Facility-Administered Medications   Medication Dose Route Frequency Provider Last Rate Last Dose   • cyanocobalamin injection 1,000 mcg  1,000 mcg Intramuscular Q28 Days Cb Weaver MD   1,000 mcg at 03/26/19 0957        The following portions of the patient's history were reviewed and updated as appropriate: allergies, current medications, past family history, past medical history, past social history, past surgical history and problem list.        Assessment/Plan   Rob was seen today for depression.    Diagnoses and all orders for this visit:    Moderate episode of recurrent major depressive disorder (CMS/HCC)    Hypercalcemia  -     PTH, Intact    B12 deficiency  -     Vitamin B12    Other orders  -     venlafaxine XR (EFFEXOR-XR) 75 MG 24 hr capsule; Take 2 capsules by mouth Daily.      Advice given.  Stop lexapro, increase effexor xr to 150mg qam, his mornings.  Continue cpap  Warned working third shift makes treating depression more difficult  Discussed rest versus sleep    Calcium little high probably due to low testosterone.     He knows increased risk mi and cva with tesosterone but I think given his resistant depression it would be needed, he agrees.                 No Follow-up on file.                  This document has been electronically signed by Cb Weaver MD on April 11, 2019 1:31 PM

## 2019-04-12 LAB
PTH-INTACT SERPL-MCNC: 52.4 PG/ML (ref 15–65)
VIT B12 BLD-MCNC: >2000 PG/ML (ref 211–946)

## 2019-04-16 ENCOUNTER — TELEPHONE (OUTPATIENT)
Dept: FAMILY MEDICINE CLINIC | Facility: CLINIC | Age: 54
End: 2019-04-16

## 2019-04-16 NOTE — TELEPHONE ENCOUNTER
Pt had a migraine yesterday and missed work. He wanted to know if you would write him a doctor's excuse?

## 2019-04-29 ENCOUNTER — TELEPHONE (OUTPATIENT)
Dept: FAMILY MEDICINE CLINIC | Facility: CLINIC | Age: 54
End: 2019-04-29

## 2019-04-29 DIAGNOSIS — F33.1 MODERATE EPISODE OF RECURRENT MAJOR DEPRESSIVE DISORDER (HCC): Primary | ICD-10-CM

## 2019-04-29 NOTE — TELEPHONE ENCOUNTER
Was seeing [facility]/ they put him back on the lexapro you had given him and it does not work. Can you refer hime to somewhere else. There is a Zees[facility] Horton at [place] or where ever you think would help. Jenny says he is in bad shape.  Is there anything else you can give him until he can get into another mental health center?  [phone]

## 2019-05-01 RX ORDER — LISINOPRIL 20 MG/1
20 TABLET ORAL DAILY
Qty: 30 TABLET | Refills: 11 | Status: SHIPPED | OUTPATIENT
Start: 2019-05-01 | End: 2020-05-26

## 2019-05-06 RX ORDER — DICYCLOMINE HCL 20 MG
20 TABLET ORAL EVERY 6 HOURS
Qty: 120 TABLET | Refills: 11 | OUTPATIENT
Start: 2019-05-06

## 2019-05-06 RX ORDER — DICYCLOMINE HCL 20 MG
20 TABLET ORAL EVERY 6 HOURS
Qty: 360 TABLET | Refills: 3 | Status: SHIPPED | OUTPATIENT
Start: 2019-05-06 | End: 2020-05-12

## 2019-05-14 ENCOUNTER — OFFICE VISIT (OUTPATIENT)
Dept: SLEEP MEDICINE | Facility: HOSPITAL | Age: 54
End: 2019-05-14

## 2019-05-14 VITALS
HEIGHT: 64 IN | BODY MASS INDEX: 36.7 KG/M2 | DIASTOLIC BLOOD PRESSURE: 81 MMHG | WEIGHT: 215 LBS | OXYGEN SATURATION: 99 % | HEART RATE: 79 BPM | SYSTOLIC BLOOD PRESSURE: 123 MMHG

## 2019-05-14 DIAGNOSIS — G47.33 OBSTRUCTIVE SLEEP APNEA, ADULT: Primary | ICD-10-CM

## 2019-05-14 PROCEDURE — 99214 OFFICE O/P EST MOD 30 MIN: CPT | Performed by: NURSE PRACTITIONER

## 2019-05-14 PROCEDURE — 99406 BEHAV CHNG SMOKING 3-10 MIN: CPT | Performed by: NURSE PRACTITIONER

## 2019-05-14 NOTE — PROGRESS NOTES
Sleep Clinic Follow Up    Date: 2019  Primary Care Physician: Cb Weaver MD    Last office visit: 2017 (I reviewed this note)    CC: Follow up: JOSSELINE on CPAP      Interim History:  Since the last visit:    1)  JOSSELINE -  Rob Josh Serrato has remained compliant with CPAP. He denies mask and machine issues, dry mouth, headaches, or pressures intolerance. He denies abnormal dreams, sleep paralysis, nasal congestion, URI sx.    Sleep Testin. PSG on 2010, AHI of 103   2. CPAP titration on 2010, recommended 14 cm H2O   3. Currently on 14 cm H2O    PAP Data:    Time frame: 2019-2019   Compliance 100 %  Average use on days used: 8 hrs 10 min  Percent of days with usage greater than or equal to 4 hours: 100%  PAP range : 12-20 cm H2O  Average 90% pressure: 14 cmH2O  Leak: 2 minutes  Average AHI 0.5 events/hr  Mask type: Full face mask  DME: [facility]    Bed time: 1100  Sleep latency: 15-20 minutes  Number of times awakens during the night: 1-2  Wake time: 2000  Estimated total sleep time at night: 7-8 hours  Caffeine intake: 2-3 cups of coffee, 1-2 gallons of tea, and 0 oz of soda  Alcohol intake: 0 drinks per week  Nap time: rare   Sleepiness with Driving: none     [place] - 7    2) Patient denies RLS symptoms.     PMHx, FH, SH reviewed and pertinent changes are: Started on Zoloft for depression/anxiety, started Vitamin B12 shots.       REVIEW OF SYSTEMS:   (+) for headaches occasionally (thinks they are related to stress). Negative for chest pain, fever, cough, SOA, abdominal pain.     Smoking:vaping 8 mL fluid 2 x per day    I advised Rob of the risks of continuing to use tobacco, and I provided him with tobacco cessation educational materials in the After Visit Summary.     During this visit, I spent 3 minutes counseling the patient regarding tobacco cessation.      Exam:  Vitals:    19 0857   BP: 123/81   Pulse: 79   SpO2: 99%           19  0857   Weight: 97.5 kg (215  lb)     Body mass index is 36.89 kg/m². Patient's Body mass index is 36.89 kg/m². BMI is above normal parameters. Recommendations include: referral to primary care.      Gen:                No distress, conversant, pleasant, appears stated age, alert, oriented  Eyes:               Anicteric sclera, moist conjunctiva, no lid lag                           PERRL, EOMI   Heent:             NC/AT                          Oropharynx clear                          Normal hearing  Lungs:             Normal effort, non-labored breathing                          Clear to auscultation bilaterally          CV:                  Normal S1/S2, no murmur                          No lower extremity edema  ABD:               Soft, rounded, non-distended                          Normal bowel sounds                    Psych:             Appropriate affect  Neuro:             CN 2-12 appear intact    Past Medical History:   Diagnosis Date   • Anxiety state    • Asthma    • Disorder of skeletal system    • Dyspnea    • Gastroesophageal reflux disease    • Hyperlipidemia    • Malaise and fatigue    • Male hypogonadism    • Restless legs    • Sleep apnea    • Tobacco dependence syndrome    • Ureteric stone     rt 2mm stone   • Vertigo        Current Outpatient Medications:   •  dicyclomine (BENTYL) 20 MG tablet, Take 1 tablet by mouth Every 6 (Six) Hours., Disp: 360 tablet, Rfl: 3  •  lisinopril (PRINIVIL,ZESTRIL) 20 MG tablet, TAKE 1 TABLET BY MOUTH DAILY., Disp: 30 tablet, Rfl: 11  •  sertraline (ZOLOFT) 50 MG tablet, Take 1 tablet by mouth Daily., Disp: 30 tablet, Rfl: 11  •  Testosterone Cypionate (DEPO-TESTOSTERONE) 200 MG/ML injection, Inject 1 mL into the appropriate muscle as directed by prescriber Every 14 (Fourteen) Days. Inject 200 mg (1ML) every week., Disp: 2 mL, Rfl: 5  •  venlafaxine XR (EFFEXOR-XR) 75 MG 24 hr capsule, Take 2 capsules by mouth Daily., Disp: 60 capsule, Rfl: 11  •  meclizine (ANTIVERT) 25 MG tablet, Take 1  "tablet by mouth 3 (Three) Times a Day As Needed for dizziness., Disp: 90 tablet, Rfl: 0    Current Facility-Administered Medications:   •  cyanocobalamin injection 1,000 mcg, 1,000 mcg, Intramuscular, Q28 Days, Cb Weaver MD, 1,000 mcg at 03/26/19 0957      Assessment and Plan:    1. Obstructive sleep apnea Established, stable (1)  1. Compliant with PAP therapy  2. Continue PAP as prescribed  3. Script for PAP supplies  4. Return to clinic in 1 year with compliance report unless change in sx  2. Nicotine dependence without complication Established, not controlled (2)  1. Given [employer]s \"Thinking of quitting smoking\" flyer and referred patient to call 4-800[employer] 360 TechnologyQUIT-NOW. Smoking and tobacco use cessation counseling visit was 3 minutes.         3. Anxiety and depression   1.   Continue Zoloft per PCP.      Total time 15 min, more than half spent in face to face counseling and coordination of care.    RTC in 12 months        This document has been electronically signed by KISHA Buck on May 14, 2019 9:04 AM                      CC: Cb Weaver MD          No ref. provider found  "

## 2019-10-28 RX ORDER — TESTOSTERONE CYPIONATE 200 MG/ML
INJECTION, SOLUTION INTRAMUSCULAR
Qty: 2 ML | Refills: 5 | OUTPATIENT
Start: 2019-10-28

## 2019-10-29 ENCOUNTER — APPOINTMENT (OUTPATIENT)
Dept: LAB | Facility: HOSPITAL | Age: 54
End: 2019-10-29

## 2019-10-29 ENCOUNTER — OFFICE VISIT (OUTPATIENT)
Dept: FAMILY MEDICINE CLINIC | Facility: CLINIC | Age: 54
End: 2019-10-29

## 2019-10-29 VITALS
SYSTOLIC BLOOD PRESSURE: 110 MMHG | DIASTOLIC BLOOD PRESSURE: 70 MMHG | HEIGHT: 64 IN | BODY MASS INDEX: 37.83 KG/M2 | WEIGHT: 221.6 LBS | OXYGEN SATURATION: 98 % | HEART RATE: 87 BPM | TEMPERATURE: 98.5 F

## 2019-10-29 DIAGNOSIS — R42 VERTIGO: ICD-10-CM

## 2019-10-29 DIAGNOSIS — G44.52 NEW DAILY PERSISTENT HEADACHE: ICD-10-CM

## 2019-10-29 DIAGNOSIS — E29.1 MALE HYPOGONADISM: Primary | ICD-10-CM

## 2019-10-29 DIAGNOSIS — E53.8 B12 DEFICIENCY: ICD-10-CM

## 2019-10-29 LAB
ALT SERPL W P-5'-P-CCNC: 14 U/L (ref 1–41)
FOLATE SERPL-MCNC: 2.66 NG/ML (ref 4.78–24.2)
PSA SERPL-MCNC: 0.35 NG/ML (ref 0–4)
TESTOST SERPL-MCNC: >1500 NG/DL (ref 193–740)
VIT B12 BLD-MCNC: 240 PG/ML (ref 211–946)

## 2019-10-29 PROCEDURE — 82746 ASSAY OF FOLIC ACID SERUM: CPT | Performed by: FAMILY MEDICINE

## 2019-10-29 PROCEDURE — 84460 ALANINE AMINO (ALT) (SGPT): CPT | Performed by: FAMILY MEDICINE

## 2019-10-29 PROCEDURE — 84403 ASSAY OF TOTAL TESTOSTERONE: CPT | Performed by: FAMILY MEDICINE

## 2019-10-29 PROCEDURE — G0103 PSA SCREENING: HCPCS | Performed by: FAMILY MEDICINE

## 2019-10-29 PROCEDURE — 99213 OFFICE O/P EST LOW 20 MIN: CPT | Performed by: FAMILY MEDICINE

## 2019-10-29 PROCEDURE — 82607 VITAMIN B-12: CPT | Performed by: FAMILY MEDICINE

## 2019-10-29 RX ORDER — MECLIZINE HYDROCHLORIDE 25 MG/1
25 TABLET ORAL 3 TIMES DAILY PRN
Qty: 90 TABLET | Refills: 0 | Status: SHIPPED | OUTPATIENT
Start: 2019-10-29 | End: 2022-07-26

## 2019-10-29 NOTE — PATIENT INSTRUCTIONS

## 2019-10-29 NOTE — PROGRESS NOTES
" Subjective   Rob Josh Serrato is a 53 y.o. male.     History of Present Illness     Here for testosterone check  Also  Dizziness off and on for 1.5 months  Also  Headache that moves around 1.5 months.  It may be in the front or in the back of head. Taking aleve daily and not helping  Works third shift  effexor xr 150mg upon awakening and zoloft 50mg at his bedtime he says is working well.  No new medicines past 1.5 months  Not getting b12 injections, it cost $90.  He is taking b12 orally.   He is using his cpap  He woke up one morning and entire body was tingling like it does when foot falls asleep.       Review of Systems   Constitutional: Negative for chills, fatigue and fever.   HENT: Negative for congestion, ear discharge, ear pain, facial swelling, hearing loss, postnasal drip, rhinorrhea, sinus pressure, sore throat, trouble swallowing and voice change.    Eyes: Negative for discharge, redness and visual disturbance.   Respiratory: Negative for cough, chest tightness, shortness of breath and wheezing.    Cardiovascular: Negative for chest pain and palpitations.   Gastrointestinal: Negative for abdominal pain, blood in stool, constipation, diarrhea, nausea and vomiting.   Endocrine: Negative for polydipsia and polyuria.   Genitourinary: Negative for dysuria, flank pain, hematuria and urgency.   Musculoskeletal: Negative for arthralgias, back pain, joint swelling and myalgias.   Skin: Negative for rash.   Neurological: Positive for dizziness and headaches. Negative for weakness and numbness.   Hematological: Negative for adenopathy.   Psychiatric/Behavioral: Negative for confusion and sleep disturbance. The patient is not nervous/anxious.            /70 (BP Location: Left arm, Patient Position: Sitting, Cuff Size: Adult)   Pulse 87   Temp 98.5 °F (36.9 °C) (Temporal)   Ht 162.6 cm (64.02\")   Wt 101 kg (221 lb 9.6 oz)   SpO2 98%   BMI 38.02 kg/m²       Objective     Physical Exam   Constitutional: " He is oriented to person, place, and time. He appears well-developed and well-nourished.   HENT:   Head: Normocephalic and atraumatic.   Right Ear: External ear normal.   Left Ear: External ear normal.   Nose: Nose normal.   Mouth/Throat: Oropharynx is clear and moist.   Eyes: Conjunctivae and EOM are normal. Pupils are equal, round, and reactive to light.   Neck: Normal range of motion. Neck supple.   Cardiovascular: Normal rate, regular rhythm and normal heart sounds. Exam reveals no gallop and no friction rub.   No murmur heard.  Pulmonary/Chest: Effort normal and breath sounds normal.   Abdominal: Soft. Bowel sounds are normal. He exhibits no distension. There is no tenderness. There is no rebound and no guarding.   Musculoskeletal: Normal range of motion. He exhibits no edema or deformity.   Neurological: He is alert and oriented to person, place, and time. No cranial nerve deficit.   Skin: Skin is warm and dry. No rash noted. No erythema.   Psychiatric: He has a normal mood and affect. His behavior is normal. Judgment and thought content normal.   Nursing note and vitals reviewed.          PAST MEDICAL HISTORY     Past Medical History:   Diagnosis Date   • Anxiety state    • Asthma    • Disorder of skeletal system    • Dyspnea    • Gastroesophageal reflux disease    • Hyperlipidemia    • Malaise and fatigue    • Male hypogonadism    • Restless legs    • Sleep apnea    • Tobacco dependence syndrome    • Ureteric stone     rt 2mm stone   • Vertigo       PAST SURGICAL HISTORY     Past Surgical History:   Procedure Laterality Date   • COLONOSCOPY     • LAPAROSCOPY ESOPHAGOGASTRIC FUNDOPLASTY HYBRID  03/09/2010    Fundoplasty, laparoscopic (1)      SOCIAL HISTORY     Social History     Socioeconomic History   • Marital status:      Spouse name: Not on file   • Number of children: Not on file   • Years of education: Not on file   • Highest education level: Not on file   Tobacco Use   • Smoking status: Current  Every Day Smoker     Packs/day: 0.50     Types: Electronic Cigarette   • Smokeless tobacco: Never Used   • Tobacco comment: Current Smoker   Substance and Sexual Activity   • Alcohol use: Yes     Comment: social   • Drug use: No   • Sexual activity: Defer      ALLERGIES   Codeine and Penicillins   MEDICATIONS     Current Outpatient Medications   Medication Sig Dispense Refill   • dicyclomine (BENTYL) 20 MG tablet Take 1 tablet by mouth Every 6 (Six) Hours. 360 tablet 3   • lisinopril (PRINIVIL,ZESTRIL) 20 MG tablet TAKE 1 TABLET BY MOUTH DAILY. 30 tablet 11   • sertraline (ZOLOFT) 50 MG tablet Take 1 tablet by mouth Daily. 30 tablet 11   • Testosterone Cypionate (DEPO-TESTOSTERONE) 200 MG/ML injection Inject 1 mL into the appropriate muscle as directed by prescriber Every 14 (Fourteen) Days. Inject 200 mg (1ML) every week. 2 mL 5   • venlafaxine XR (EFFEXOR-XR) 75 MG 24 hr capsule Take 2 capsules by mouth Daily. 60 capsule 11   • meclizine (ANTIVERT) 25 MG tablet Take 1 tablet by mouth 3 (Three) Times a Day As Needed for dizziness. 90 tablet 0     Current Facility-Administered Medications   Medication Dose Route Frequency Provider Last Rate Last Dose   • cyanocobalamin injection 1,000 mcg  1,000 mcg Intramuscular Q28 Days Cb Weaver MD   1,000 mcg at 03/26/19 0957        The following portions of the patient's history were reviewed and updated as appropriate: allergies, current medications, past family history, past medical history, past social history, past surgical history and problem list.        Assessment/Plan   Rob was seen today for med refill.    Diagnoses and all orders for this visit:    Male hypogonadism  -     Testosterone  -     PSA Screen  -     ALT    B12 deficiency  -     Vitamin B12  -     Folate    New daily persistent headache    Vertigo    Other orders  -     meclizine (ANTIVERT) 25 MG tablet; Take 1 tablet by mouth 3 (Three) Times a Day As Needed for dizziness.      Will call with  results  Probably having tension ha, taking aleve daily and now chronic rebound ha.  Stop aleve, ice pack to head.     If vertigo and ha don't improve will send to neuro                     No Follow-up on file.                  This document has been electronically signed by Cb Weaver MD on October 29, 2019 9:27 AM

## 2019-10-30 DIAGNOSIS — E29.1 MALE HYPOGONADISM: Primary | ICD-10-CM

## 2019-10-30 RX ORDER — FOLIC ACID 1 MG/1
1 TABLET ORAL DAILY
Qty: 90 TABLET | Refills: 3 | Status: SHIPPED | OUTPATIENT
Start: 2019-10-30 | End: 2020-11-10

## 2019-11-11 ENCOUNTER — LAB (OUTPATIENT)
Dept: LAB | Facility: HOSPITAL | Age: 54
End: 2019-11-11

## 2019-11-11 DIAGNOSIS — E29.1 MALE HYPOGONADISM: ICD-10-CM

## 2019-11-11 LAB — TESTOST SERPL-MCNC: 140 NG/DL (ref 193–740)

## 2019-11-11 PROCEDURE — 84403 ASSAY OF TOTAL TESTOSTERONE: CPT | Performed by: FAMILY MEDICINE

## 2019-11-12 ENCOUNTER — TELEPHONE (OUTPATIENT)
Dept: FAMILY MEDICINE CLINIC | Facility: CLINIC | Age: 54
End: 2019-11-12

## 2019-11-12 RX ORDER — TESTOSTERONE CYPIONATE 200 MG/ML
200 INJECTION, SOLUTION INTRAMUSCULAR
Qty: 2 ML | Refills: 5 | Status: SHIPPED | OUTPATIENT
Start: 2019-11-12 | End: 2020-03-16 | Stop reason: SDUPTHER

## 2019-11-12 NOTE — TELEPHONE ENCOUNTER
----- Message from Viviana Morton MA sent at 11/12/2019 11:00 AM CST -----      ----- Message -----  From: Cb Weaver MD  Sent: 11/12/2019   8:02 AM  To: Viviana Morton MA    Looks fine

## 2019-11-14 ENCOUNTER — OFFICE VISIT (OUTPATIENT)
Dept: FAMILY MEDICINE CLINIC | Facility: CLINIC | Age: 54
End: 2019-11-14

## 2019-11-14 VITALS
DIASTOLIC BLOOD PRESSURE: 60 MMHG | SYSTOLIC BLOOD PRESSURE: 110 MMHG | OXYGEN SATURATION: 98 % | TEMPERATURE: 98.2 F | HEART RATE: 81 BPM

## 2019-11-14 DIAGNOSIS — M54.50 RIGHT LOW BACK PAIN, UNSPECIFIED CHRONICITY, UNSPECIFIED WHETHER SCIATICA PRESENT: ICD-10-CM

## 2019-11-14 DIAGNOSIS — R07.9 CHEST PAIN, UNSPECIFIED TYPE: Primary | ICD-10-CM

## 2019-11-14 LAB
BILIRUB BLD-MCNC: NEGATIVE MG/DL
CLARITY, POC: CLEAR
COLOR UR: YELLOW
GLUCOSE UR STRIP-MCNC: NEGATIVE MG/DL
KETONES UR QL: NEGATIVE
LEUKOCYTE EST, POC: NEGATIVE
NITRITE UR-MCNC: NEGATIVE MG/ML
PH UR: 7 [PH] (ref 5–8)
PROT UR STRIP-MCNC: NEGATIVE MG/DL
RBC # UR STRIP: NEGATIVE /UL
SP GR UR: 1.02 (ref 1–1.03)
UROBILINOGEN UR QL: NORMAL

## 2019-11-14 PROCEDURE — 99213 OFFICE O/P EST LOW 20 MIN: CPT | Performed by: FAMILY MEDICINE

## 2019-11-14 PROCEDURE — 96372 THER/PROPH/DIAG INJ SC/IM: CPT | Performed by: FAMILY MEDICINE

## 2019-11-14 PROCEDURE — 93005 ELECTROCARDIOGRAM TRACING: CPT | Performed by: FAMILY MEDICINE

## 2019-11-14 PROCEDURE — 93010 ELECTROCARDIOGRAM REPORT: CPT | Performed by: INTERNAL MEDICINE

## 2019-11-14 PROCEDURE — 81002 URINALYSIS NONAUTO W/O SCOPE: CPT | Performed by: FAMILY MEDICINE

## 2019-11-14 RX ORDER — KETOROLAC TROMETHAMINE 30 MG/ML
60 INJECTION, SOLUTION INTRAMUSCULAR; INTRAVENOUS ONCE
Status: COMPLETED | OUTPATIENT
Start: 2019-11-14 | End: 2019-11-14

## 2019-11-14 RX ORDER — TIZANIDINE 4 MG/1
4 TABLET ORAL EVERY 6 HOURS PRN
Qty: 60 TABLET | Refills: 11 | Status: SHIPPED | OUTPATIENT
Start: 2019-11-14

## 2019-11-14 RX ADMIN — KETOROLAC TROMETHAMINE 60 MG: 30 INJECTION, SOLUTION INTRAMUSCULAR; INTRAVENOUS at 11:30

## 2019-11-14 NOTE — PROGRESS NOTES
Subjective   Rob Josh Serrato is a 53 y.o. male.     History of Present Illness     Right low back pain.  Thinks may be kidney stone.  Them when ariving, complained of chest pain and pain all over.   Wants to go to work tonight.       Review of Systems   Constitutional: Negative for chills, fatigue and fever.   HENT: Negative for congestion, ear discharge, ear pain, facial swelling, hearing loss, postnasal drip, rhinorrhea, sinus pressure, sore throat, trouble swallowing and voice change.    Eyes: Negative for discharge, redness and visual disturbance.   Respiratory: Negative for cough, chest tightness, shortness of breath and wheezing.    Cardiovascular: Negative for chest pain and palpitations.   Gastrointestinal: Negative for abdominal pain, blood in stool, constipation, diarrhea, nausea and vomiting.   Endocrine: Negative for polydipsia and polyuria.   Genitourinary: Negative for dysuria, flank pain, hematuria and urgency.   Musculoskeletal: Positive for back pain. Negative for arthralgias, joint swelling and myalgias.   Skin: Negative for rash.   Neurological: Negative for dizziness, weakness, numbness and headaches.   Hematological: Negative for adenopathy.   Psychiatric/Behavioral: Negative for confusion and sleep disturbance. The patient is not nervous/anxious.            /60 (BP Location: Right arm, Patient Position: Sitting, Cuff Size: Adult)   Pulse 81   Temp 98.2 °F (36.8 °C) (Temporal)   SpO2 98%       Objective     Physical Exam   Constitutional: He is oriented to person, place, and time. He appears well-developed and well-nourished.   HENT:   Head: Normocephalic and atraumatic.   Right Ear: External ear normal.   Left Ear: External ear normal.   Nose: Nose normal.   Eyes: Conjunctivae and EOM are normal. Pupils are equal, round, and reactive to light.   Neck: Normal range of motion.   Pulmonary/Chest: Effort normal.   Abdominal: Soft. He exhibits no distension. There is no tenderness.    Musculoskeletal: Normal range of motion.   Neurological: He is alert and oriented to person, place, and time.   Psychiatric: He has a normal mood and affect. His behavior is normal. Judgment and thought content normal.   Nursing note and vitals reviewed.          PAST MEDICAL HISTORY     Past Medical History:   Diagnosis Date   • Anxiety state    • Asthma    • Disorder of skeletal system    • Dyspnea    • Gastroesophageal reflux disease    • Hyperlipidemia    • Malaise and fatigue    • Male hypogonadism    • Restless legs    • Sleep apnea    • Tobacco dependence syndrome    • Ureteric stone     rt 2mm stone   • Vertigo       PAST SURGICAL HISTORY     Past Surgical History:   Procedure Laterality Date   • COLONOSCOPY     • LAPAROSCOPY ESOPHAGOGASTRIC FUNDOPLASTY HYBRID  03/09/2010    Fundoplasty, laparoscopic (1)      SOCIAL HISTORY     Social History     Socioeconomic History   • Marital status:      Spouse name: Not on file   • Number of children: Not on file   • Years of education: Not on file   • Highest education level: Not on file   Tobacco Use   • Smoking status: Current Every Day Smoker     Packs/day: 0.50     Types: Electronic Cigarette   • Smokeless tobacco: Never Used   • Tobacco comment: Current Smoker   Substance and Sexual Activity   • Alcohol use: Yes     Comment: social   • Drug use: No   • Sexual activity: Defer      ALLERGIES   Codeine and Penicillins   MEDICATIONS     Current Outpatient Medications   Medication Sig Dispense Refill   • dicyclomine (BENTYL) 20 MG tablet Take 1 tablet by mouth Every 6 (Six) Hours. 360 tablet 3   • folic acid (FOLVITE) 1 MG tablet Take 1 tablet by mouth Daily. 90 tablet 3   • lisinopril (PRINIVIL,ZESTRIL) 20 MG tablet TAKE 1 TABLET BY MOUTH DAILY. 30 tablet 11   • meclizine (ANTIVERT) 25 MG tablet Take 1 tablet by mouth 3 (Three) Times a Day As Needed for dizziness. 90 tablet 0   • sertraline (ZOLOFT) 50 MG tablet Take 1 tablet by mouth Daily. 30 tablet 11   •  Testosterone Cypionate (DEPO-TESTOSTERONE) 200 MG/ML injection Inject 1 mL into the appropriate muscle as directed by prescriber Every 14 (Fourteen) Days. Inject 200 mg (1ML) every week. 2 mL 5   • venlafaxine XR (EFFEXOR-XR) 75 MG 24 hr capsule Take 2 capsules by mouth Daily. 60 capsule 11   • tiZANidine (ZANAFLEX) 4 MG tablet Take 1 tablet by mouth Every 6 (Six) Hours As Needed for Muscle Spasms. 60 tablet 11     Current Facility-Administered Medications   Medication Dose Route Frequency Provider Last Rate Last Dose   • cyanocobalamin injection 1,000 mcg  1,000 mcg Intramuscular Q28 Days Cb Weaver MD   1,000 mcg at 03/26/19 0957        The following portions of the patient's history were reviewed and updated as appropriate: allergies, current medications, past family history, past medical history, past social history, past surgical history and problem list.        Assessment/Plan   Rob was seen today for chest pain and arm pain.    Diagnoses and all orders for this visit:    Chest pain, unspecified type  -     ECG 12 Lead  -     XR Abdomen KUB (In Office)    Right low back pain, unspecified chronicity, unspecified whether sciatica present  -     ketorolac (TORADOL) injection 60 mg    Other orders  -     tiZANidine (ZANAFLEX) 4 MG tablet; Take 1 tablet by mouth Every 6 (Six) Hours As Needed for Muscle Spasms.        ekg normal  No blood in urine  Xray, nothing acute    Return for another shot this pm if needed.     He reported no rash               No Follow-up on file.                  This document has been electronically signed by Cb Weaver MD on November 14, 2019 1:37 PM

## 2019-11-14 NOTE — PATIENT INSTRUCTIONS

## 2019-11-22 ENCOUNTER — TELEPHONE (OUTPATIENT)
Dept: FAMILY MEDICINE CLINIC | Facility: CLINIC | Age: 54
End: 2019-11-22

## 2019-11-22 NOTE — TELEPHONE ENCOUNTER
Patient states that he missed a call from toney rosario yesterday and would like a call back to discuss what it is in regards to.

## 2019-11-26 ENCOUNTER — OFFICE VISIT (OUTPATIENT)
Dept: FAMILY MEDICINE CLINIC | Facility: CLINIC | Age: 54
End: 2019-11-26

## 2019-11-26 VITALS
HEART RATE: 85 BPM | OXYGEN SATURATION: 98 % | HEIGHT: 64 IN | WEIGHT: 218.2 LBS | BODY MASS INDEX: 37.25 KG/M2 | SYSTOLIC BLOOD PRESSURE: 110 MMHG | DIASTOLIC BLOOD PRESSURE: 76 MMHG | TEMPERATURE: 98.7 F

## 2019-11-26 DIAGNOSIS — R51.9 NONINTRACTABLE HEADACHE, UNSPECIFIED CHRONICITY PATTERN, UNSPECIFIED HEADACHE TYPE: ICD-10-CM

## 2019-11-26 DIAGNOSIS — M54.9 OTHER ACUTE BACK PAIN: Primary | ICD-10-CM

## 2019-11-26 DIAGNOSIS — R10.84 GENERALIZED ABDOMINAL PAIN: ICD-10-CM

## 2019-11-26 DIAGNOSIS — R42 DIZZINESS: ICD-10-CM

## 2019-11-26 PROCEDURE — 99214 OFFICE O/P EST MOD 30 MIN: CPT | Performed by: FAMILY MEDICINE

## 2019-11-26 RX ORDER — SYRINGE WITH NEEDLE, 1 ML 25GX5/8"
SYRINGE, EMPTY DISPOSABLE MISCELLANEOUS
COMMUNITY
Start: 2019-11-12

## 2019-11-26 RX ORDER — COLESEVELAM 180 1/1
1875 TABLET ORAL 2 TIMES DAILY WITH MEALS
Qty: 180 TABLET | Refills: 11 | Status: SHIPPED | OUTPATIENT
Start: 2019-11-26 | End: 2020-11-30

## 2019-11-26 RX ORDER — L.RHAMNOSUS/B.ANIMALIS(LACTIS) 3B CELL
1 CAPSULE ORAL DAILY
Qty: 30 CAPSULE | Refills: 11 | Status: SHIPPED | OUTPATIENT
Start: 2019-11-26 | End: 2022-07-26

## 2019-11-26 NOTE — PATIENT INSTRUCTIONS

## 2019-11-26 NOTE — PROGRESS NOTES
" Subjective   Rob Josh Serrato is a 53 y.o. male.     History of Present Illness     Bloated again.  Back pain center lower back. Works on his feet a lot, no trauma.    Review of Systems   Constitutional: Negative for chills, fatigue and fever.   HENT: Negative for congestion, ear discharge, ear pain, facial swelling, hearing loss, postnasal drip, rhinorrhea, sinus pressure, sore throat, trouble swallowing and voice change.    Eyes: Negative for discharge, redness and visual disturbance.   Respiratory: Negative for cough, chest tightness, shortness of breath and wheezing.    Cardiovascular: Negative for chest pain and palpitations.   Gastrointestinal: Positive for abdominal pain. Negative for blood in stool, constipation, diarrhea, nausea and vomiting.   Endocrine: Negative for polydipsia and polyuria.   Genitourinary: Negative for dysuria, flank pain, hematuria and urgency.   Musculoskeletal: Positive for back pain. Negative for arthralgias, joint swelling and myalgias.   Skin: Negative for rash.   Neurological: Positive for dizziness and headaches. Negative for weakness and numbness.   Hematological: Negative for adenopathy.   Psychiatric/Behavioral: Negative for confusion and sleep disturbance. The patient is not nervous/anxious.            /76 (BP Location: Left arm, Patient Position: Sitting, Cuff Size: Adult)   Pulse 85   Temp 98.7 °F (37.1 °C) (Temporal)   Ht 162.6 cm (64.02\")   Wt 99 kg (218 lb 3.2 oz)   SpO2 98%   BMI 37.44 kg/m²       Objective     Physical Exam   Constitutional: He is oriented to person, place, and time. He appears well-developed and well-nourished.   HENT:   Head: Normocephalic and atraumatic.   Right Ear: External ear normal.   Left Ear: External ear normal.   Nose: Nose normal.   Eyes: Conjunctivae and EOM are normal. Pupils are equal, round, and reactive to light.   Neck: Normal range of motion.   Pulmonary/Chest: Effort normal.   Abdominal: Soft.   Musculoskeletal: " "Normal range of motion.   No si joint pain either side   Neurological: He is alert and oriented to person, place, and time.   Psychiatric: He has a normal mood and affect. His behavior is normal. Judgment and thought content normal.   Nursing note and vitals reviewed.          PAST MEDICAL HISTORY     Past Medical History:   Diagnosis Date   • Anxiety state    • Asthma    • Disorder of skeletal system    • Dyspnea    • Gastroesophageal reflux disease    • Hyperlipidemia    • Malaise and fatigue    • Male hypogonadism    • Restless legs    • Sleep apnea    • Tobacco dependence syndrome    • Ureteric stone     rt 2mm stone   • Vertigo       PAST SURGICAL HISTORY     Past Surgical History:   Procedure Laterality Date   • COLONOSCOPY     • LAPAROSCOPY ESOPHAGOGASTRIC FUNDOPLASTY HYBRID  03/09/2010    Fundoplasty, laparoscopic (1)      SOCIAL HISTORY     Social History     Socioeconomic History   • Marital status:      Spouse name: Not on file   • Number of children: Not on file   • Years of education: Not on file   • Highest education level: Not on file   Tobacco Use   • Smoking status: Current Every Day Smoker     Packs/day: 0.50     Types: Electronic Cigarette   • Smokeless tobacco: Never Used   • Tobacco comment: Current Smoker   Substance and Sexual Activity   • Alcohol use: Yes     Comment: social   • Drug use: No   • Sexual activity: Defer      ALLERGIES   Codeine and Penicillins   MEDICATIONS     Current Outpatient Medications   Medication Sig Dispense Refill   • B-D 3CC LUER-RODERICK SYR 25GX1/2\" 25G X 1-1/2\" 3 ML misc      • dicyclomine (BENTYL) 20 MG tablet Take 1 tablet by mouth Every 6 (Six) Hours. 360 tablet 3   • folic acid (FOLVITE) 1 MG tablet Take 1 tablet by mouth Daily. 90 tablet 3   • lisinopril (PRINIVIL,ZESTRIL) 20 MG tablet TAKE 1 TABLET BY MOUTH DAILY. 30 tablet 11   • meclizine (ANTIVERT) 25 MG tablet Take 1 tablet by mouth 3 (Three) Times a Day As Needed for dizziness. 90 tablet 0   • " sertraline (ZOLOFT) 50 MG tablet Take 1 tablet by mouth Daily. 30 tablet 11   • Testosterone Cypionate (DEPO-TESTOSTERONE) 200 MG/ML injection Inject 1 mL into the appropriate muscle as directed by prescriber Every 14 (Fourteen) Days. Inject 200 mg (1ML) every week. 2 mL 5   • tiZANidine (ZANAFLEX) 4 MG tablet Take 1 tablet by mouth Every 6 (Six) Hours As Needed for Muscle Spasms. 60 tablet 11   • venlafaxine XR (EFFEXOR-XR) 75 MG 24 hr capsule Take 2 capsules by mouth Daily. 60 capsule 11   • colesevelam (WELCHOL) 625 MG tablet Take 3 tablets by mouth 2 (Two) Times a Day With Meals. 180 tablet 11   • Probiotic Product ([employer]) capsule Take 1 capsule by mouth Daily. 30 capsule 11     Current Facility-Administered Medications   Medication Dose Route Frequency Provider Last Rate Last Dose   • cyanocobalamin injection 1,000 mcg  1,000 mcg Intramuscular Q28 Days Cb Weaver MD   1,000 mcg at 03/26/19 0957        The following portions of the patient's history were reviewed and updated as appropriate: allergies, current medications, past family history, past medical history, past social history, past surgical history and problem list.        Assessment/Plan   Rob was seen today for bloated, back pain and abdominal pain.    Diagnoses and all orders for this visit:    Other acute back pain  -     XR Spine Thoracolumbar 2 View    Generalized abdominal pain  -     XR Abdomen Flat & Upright (In Office)    Dizziness  -     Ambulatory Referral to Neurology    Nonintractable headache, unspecified chronicity pattern, unspecified headache type  -     Ambulatory Referral to Neurology    Other orders  -     colesevelam (WELCHOL) 625 MG tablet; Take 3 tablets by mouth 2 (Two) Times a Day With Meals.  -     Probiotic Product ([employer]) capsule; Take 1 capsule by mouth Daily.    xray:  Gas in colon    No carbonated beverages, chewing gum, milk  Has no gallbladder.  Try welchol.  Over time may  decrease/eliminate bentyl    Back pain:  Chiropractor and get back guard insoles.   Xray of back looks fine.    For  Headache and dizziness, referral dr gomez                   No Follow-up on file.                  This document has been electronically signed by bC Weaver MD on November 26, 2019 12:31 PM

## 2020-01-28 ENCOUNTER — TELEPHONE (OUTPATIENT)
Dept: FAMILY MEDICINE CLINIC | Facility: CLINIC | Age: 55
End: 2020-01-28

## 2020-01-28 DIAGNOSIS — J32.9 CHRONIC SINUSITIS, UNSPECIFIED LOCATION: Primary | ICD-10-CM

## 2020-01-28 NOTE — TELEPHONE ENCOUNTER
Patient called in stating he went to see the neurologist (Dr. Craver) today and he performed a MRI and CT scan. He said Dr. Carver recommended he contact Dr. Weaver and see if he can create a referral for him to see a sinus specialist. Please call patient if there any questions or once the referral has been created. Patients call back number is [phone]

## 2020-01-29 NOTE — TELEPHONE ENCOUNTER
PATIENT STATED DR. MUSA SAID HIS HEADACHES AND DIZZY SPELLS COULD BE FROM HIS SINUSES.  REPORT FROM DR. MUSA SAYS PANSINUSITIS.  REPORT IS IN YOUR TRAY.

## 2020-03-09 ENCOUNTER — OFFICE VISIT (OUTPATIENT)
Dept: OTOLARYNGOLOGY | Facility: CLINIC | Age: 55
End: 2020-03-09

## 2020-03-09 VITALS — HEIGHT: 64 IN | WEIGHT: 220.4 LBS | BODY MASS INDEX: 37.63 KG/M2 | TEMPERATURE: 98.3 F

## 2020-03-09 DIAGNOSIS — J34.3 NASAL TURBINATE HYPERTROPHY: ICD-10-CM

## 2020-03-09 DIAGNOSIS — J32.4 CHRONIC PANSINUSITIS: ICD-10-CM

## 2020-03-09 DIAGNOSIS — J34.2 DEVIATED SEPTUM: Primary | ICD-10-CM

## 2020-03-09 PROCEDURE — 99204 OFFICE O/P NEW MOD 45 MIN: CPT | Performed by: OTOLARYNGOLOGY

## 2020-03-09 RX ORDER — LEVOFLOXACIN 500 MG/1
500 TABLET, FILM COATED ORAL DAILY
Qty: 14 TABLET | Refills: 0 | Status: SHIPPED | OUTPATIENT
Start: 2020-03-09 | End: 2020-12-30

## 2020-03-09 RX ORDER — FLUTICASONE PROPIONATE 50 MCG
2 SPRAY, SUSPENSION (ML) NASAL 2 TIMES DAILY
Qty: 16 G | Refills: 5 | Status: SHIPPED | OUTPATIENT
Start: 2020-03-09 | End: 2022-07-26

## 2020-03-09 RX ORDER — NIFEDIPINE 30 MG/1
TABLET, FILM COATED, EXTENDED RELEASE ORAL
COMMUNITY
Start: 2020-03-02 | End: 2020-08-11

## 2020-03-09 RX ORDER — LANOLIN ALCOHOL/MO/W.PET/CERES
1000 CREAM (GRAM) TOPICAL DAILY
COMMUNITY

## 2020-03-09 NOTE — PROGRESS NOTES
"Subjective   Rob Serrato is a 54 y.o. male.   Headaches and dizziness  History of Present Illness   Patient has a history of headaches and dizziness which is comes and goes without apparent cause not positionally related he said he saw a neurologist who did some studies and referred him for sinus evaluation because he feels like his symptoms are sinus related he could not find a neurologic cause.  Patient denies congestion drainage though he does have facial pressure is not having trouble breathing through his nose no epistaxis no current fever.  He says he is allergic to penicillin drugs and had an anaphylactic reaction to this unsure if he is taken a cephalosporin since then      The following portions of the patient's history were reviewed and updated as appropriate: allergies, current medications, past family history, past medical history, past social history, past surgical history and problem list.      Rob Serrato reports that he has been smoking electronic cigarette and cigarettes. He has never used smokeless tobacco. He reports that he drinks alcohol. He reports that he does not use drugs.  Patient is a tobacco user and has been counseled for use of tobacco products    Family History   Problem Relation Age of Onset   • No Known Problems Mother    • No Known Problems Father          Current Outpatient Medications:   •  B-D 3CC LUER-RODERICK SYR 25GX1/2\" 25G X 1-1/2\" 3 ML misc, , Disp: , Rfl:   •  colesevelam (WELCHOL) 625 MG tablet, Take 3 tablets by mouth 2 (Two) Times a Day With Meals., Disp: 180 tablet, Rfl: 11  •  dicyclomine (BENTYL) 20 MG tablet, Take 1 tablet by mouth Every 6 (Six) Hours., Disp: 360 tablet, Rfl: 3  •  folic acid (FOLVITE) 1 MG tablet, Take 1 tablet by mouth Daily., Disp: 90 tablet, Rfl: 3  •  lisinopril (PRINIVIL,ZESTRIL) 20 MG tablet, TAKE 1 TABLET BY MOUTH DAILY., Disp: 30 tablet, Rfl: 11  •  meclizine (ANTIVERT) 25 MG tablet, Take 1 tablet by mouth 3 (Three) Times a Day As " Needed for dizziness., Disp: 90 tablet, Rfl: 0  •  Probiotic Product ([employer]) capsule, Take 1 capsule by mouth Daily., Disp: 30 capsule, Rfl: 11  •  sertraline (ZOLOFT) 50 MG tablet, Take 1 tablet by mouth Daily., Disp: 30 tablet, Rfl: 11  •  Testosterone Cypionate (DEPO-TESTOSTERONE) 200 MG/ML injection, Inject 1 mL into the appropriate muscle as directed by prescriber Every 14 (Fourteen) Days. Inject 200 mg (1ML) every week., Disp: 2 mL, Rfl: 5  •  tiZANidine (ZANAFLEX) 4 MG tablet, Take 1 tablet by mouth Every 6 (Six) Hours As Needed for Muscle Spasms., Disp: 60 tablet, Rfl: 11  •  venlafaxine XR (EFFEXOR-XR) 75 MG 24 hr capsule, Take 2 capsules by mouth Daily., Disp: 60 capsule, Rfl: 11  •  vitamin B-12 (CYANOCOBALAMIN) 1000 MCG tablet, Take 1,000 mcg by mouth Daily., Disp: , Rfl:   •  fluticasone (FLONASE) 50 MCG/ACT nasal spray, 2 sprays into the nostril(s) as directed by provider 2 (Two) Times a Day., Disp: 16 g, Rfl: 5  •  levoFLOXacin (LEVAQUIN) 500 MG tablet, Take 1 tablet by mouth Daily., Disp: 14 tablet, Rfl: 0  •  NIFEdipine CC (ADALAT CC) 30 MG 24 hr tablet, , Disp: , Rfl:     Current Facility-Administered Medications:   •  cyanocobalamin injection 1,000 mcg, 1,000 mcg, Intramuscular, Q28 Days, Cb Weaver MD, 1,000 mcg at 03/26/19 0957    Allergies   Allergen Reactions   • Codeine      Hallucinations   • Penicillins      Other Reaction       Past Medical History:   Diagnosis Date   • Anxiety state    • Asthma    • Disorder of skeletal system    • Dyspnea    • Gastroesophageal reflux disease    • Hyperlipidemia    • Hypertension    • Malaise and fatigue    • Male hypogonadism    • Restless legs    • Sleep apnea    • Sleep apnea    • Tobacco dependence syndrome    • Ureteric stone     rt 2mm stone   • Vertigo        Past Surgical History:   Procedure Laterality Date   • CHOLECYSTECTOMY     • COLONOSCOPY     • LAPAROSCOPY ESOPHAGOGASTRIC FUNDOPLASTY HYBRID  03/09/2010    Fundoplasty,  laparoscopic (1)       Review of Systems   Constitutional: Negative for fever.   HENT: Positive for sinus pressure. Negative for congestion, ear pain, nosebleeds, postnasal drip, rhinorrhea and sinus pain.    Neurological: Positive for dizziness and headaches.   Hematological: Negative for adenopathy.   All other systems reviewed and are negative.          Objective   Physical Exam   Constitutional: He is oriented to person, place, and time. He appears well-developed and well-nourished.   HENT:   Head: Normocephalic.   Right Ear: External ear normal.   Left Ear: External ear normal.   Nose: Mucosal edema, nasal deformity and septal deviation present.       Mouth/Throat: Oropharynx is clear and moist.   Eyes: Conjunctivae are normal.   Neck: Neck supple.   Pulmonary/Chest: Effort normal.   Neurological: He is alert and oriented to person, place, and time.   Skin: Skin is warm.   Psychiatric: He has a normal mood and affect.   Vitals reviewed.    A CT scan from [facility] was obtained and was evaluated and found to have mild to moderate pansinusitis I have reviewed the actual scans and discussed them with the patient        Assessment/Plan   Rob was seen today for chronic sinusitis.    Diagnoses and all orders for this visit:    Deviated septum    Nasal turbinate hypertrophy    Chronic pansinusitis    Other orders  -     levoFLOXacin (LEVAQUIN) 500 MG tablet; Take 1 tablet by mouth Daily.  -     fluticasone (FLONASE) 50 MCG/ACT nasal spray; 2 sprays into the nostril(s) as directed by provider 2 (Two) Times a Day.      Warren smoking cessation  Because of his penicillin anaphylactic reaction limits her options suggested using Levaquin based on the severity of symptoms and discussed the black box warnings he is in agreement this will avoid oral steroids at this point is been placed on 2 weeks of antibiotics and will follow-up 2 to 3 weeks afterward he is to call if not improving in the next week and a half we will see  him sooner he may need a formal CT of the sinuses  Treatment options are limited because of his drug allergies  He is to call if any question problems medication explained the proper use  Discussed possible CT discussed surgical options I think that improved but he is not a candidate for that at this point

## 2020-03-09 NOTE — PATIENT INSTRUCTIONS

## 2020-03-11 ENCOUNTER — TELEPHONE (OUTPATIENT)
Dept: FAMILY MEDICINE CLINIC | Facility: CLINIC | Age: 55
End: 2020-03-11

## 2020-03-11 RX ORDER — OSELTAMIVIR PHOSPHATE 75 MG/1
75 CAPSULE ORAL DAILY
Qty: 10 CAPSULE | Refills: 0 | Status: SHIPPED | OUTPATIENT
Start: 2020-03-11 | End: 2020-12-30

## 2020-03-16 DIAGNOSIS — E29.1 MALE HYPOGONADISM: Primary | ICD-10-CM

## 2020-03-16 RX ORDER — TESTOSTERONE CYPIONATE 200 MG/ML
200 INJECTION, SOLUTION INTRAMUSCULAR
Qty: 2 ML | Refills: 5 | Status: SHIPPED | OUTPATIENT
Start: 2020-03-16 | End: 2020-05-12

## 2020-03-16 NOTE — TELEPHONE ENCOUNTER
Pt called and wanted to request to have Rx filled for testosterone injection. Please refill Rx for following:    Testosterone Cypionate (DEPO-TESTOSTERONE) 200 MG/ML injection      [facility] confirmed

## 2020-04-15 RX ORDER — VENLAFAXINE HYDROCHLORIDE 150 MG/1
CAPSULE, EXTENDED RELEASE ORAL
Qty: 30 CAPSULE | Refills: 10 | Status: SHIPPED | OUTPATIENT
Start: 2020-04-15 | End: 2021-04-19

## 2020-05-12 DIAGNOSIS — E29.1 MALE HYPOGONADISM: ICD-10-CM

## 2020-05-12 RX ORDER — DICYCLOMINE HCL 20 MG
20 TABLET ORAL EVERY 6 HOURS
Qty: 360 TABLET | Refills: 2 | Status: SHIPPED | OUTPATIENT
Start: 2020-05-12 | End: 2021-07-06

## 2020-05-12 RX ORDER — TESTOSTERONE CYPIONATE 200 MG/ML
INJECTION, SOLUTION INTRAMUSCULAR
Qty: 2 ML | Refills: 4 | Status: SHIPPED | OUTPATIENT
Start: 2020-05-12 | End: 2020-12-30 | Stop reason: SDUPTHER

## 2020-05-26 RX ORDER — LISINOPRIL 20 MG/1
20 TABLET ORAL DAILY
Qty: 30 TABLET | Refills: 10 | Status: SHIPPED | OUTPATIENT
Start: 2020-05-26 | End: 2022-03-24

## 2020-08-11 RX ORDER — NIFEDIPINE 30 MG/1
TABLET, FILM COATED, EXTENDED RELEASE ORAL
Qty: 30 TABLET | Refills: 4 | Status: SHIPPED | OUTPATIENT
Start: 2020-08-11 | End: 2020-12-30

## 2020-11-10 RX ORDER — FOLIC ACID 1 MG/1
1 TABLET ORAL DAILY
Qty: 90 TABLET | Refills: 2 | Status: SHIPPED | OUTPATIENT
Start: 2020-11-10 | End: 2021-09-02

## 2020-11-30 RX ORDER — COLESEVELAM 180 1/1
1875 TABLET ORAL 2 TIMES DAILY WITH MEALS
Qty: 180 TABLET | Refills: 10 | Status: SHIPPED | OUTPATIENT
Start: 2020-11-30 | End: 2022-06-17

## 2020-12-30 ENCOUNTER — LAB (OUTPATIENT)
Dept: LAB | Facility: HOSPITAL | Age: 55
End: 2020-12-30

## 2020-12-30 ENCOUNTER — OFFICE VISIT (OUTPATIENT)
Dept: FAMILY MEDICINE CLINIC | Facility: CLINIC | Age: 55
End: 2020-12-30

## 2020-12-30 VITALS
HEIGHT: 64 IN | WEIGHT: 225.4 LBS | TEMPERATURE: 98.9 F | OXYGEN SATURATION: 98 % | SYSTOLIC BLOOD PRESSURE: 112 MMHG | HEART RATE: 97 BPM | DIASTOLIC BLOOD PRESSURE: 66 MMHG | BODY MASS INDEX: 38.48 KG/M2

## 2020-12-30 DIAGNOSIS — N52.9 ERECTILE DYSFUNCTION, UNSPECIFIED ERECTILE DYSFUNCTION TYPE: ICD-10-CM

## 2020-12-30 DIAGNOSIS — E53.8 B12 DEFICIENCY: Primary | ICD-10-CM

## 2020-12-30 DIAGNOSIS — I10 ESSENTIAL HYPERTENSION: ICD-10-CM

## 2020-12-30 DIAGNOSIS — E29.1 MALE HYPOGONADISM: ICD-10-CM

## 2020-12-30 DIAGNOSIS — E53.8 FOLATE DEFICIENCY: ICD-10-CM

## 2020-12-30 DIAGNOSIS — Z12.5 SCREENING FOR PROSTATE CANCER: ICD-10-CM

## 2020-12-30 DIAGNOSIS — Z98.890 HISTORY OF NISSEN FUNDOPLICATION: ICD-10-CM

## 2020-12-30 PROCEDURE — 80061 LIPID PANEL: CPT | Performed by: FAMILY MEDICINE

## 2020-12-30 PROCEDURE — 82746 ASSAY OF FOLIC ACID SERUM: CPT | Performed by: FAMILY MEDICINE

## 2020-12-30 PROCEDURE — 99214 OFFICE O/P EST MOD 30 MIN: CPT | Performed by: FAMILY MEDICINE

## 2020-12-30 PROCEDURE — 85027 COMPLETE CBC AUTOMATED: CPT | Performed by: FAMILY MEDICINE

## 2020-12-30 PROCEDURE — 84403 ASSAY OF TOTAL TESTOSTERONE: CPT | Performed by: FAMILY MEDICINE

## 2020-12-30 PROCEDURE — G0103 PSA SCREENING: HCPCS | Performed by: FAMILY MEDICINE

## 2020-12-30 PROCEDURE — 86340 INTRINSIC FACTOR ANTIBODY: CPT | Performed by: FAMILY MEDICINE

## 2020-12-30 PROCEDURE — 80053 COMPREHEN METABOLIC PANEL: CPT | Performed by: FAMILY MEDICINE

## 2020-12-30 PROCEDURE — 82607 VITAMIN B-12: CPT | Performed by: FAMILY MEDICINE

## 2020-12-30 RX ORDER — TESTOSTERONE CYPIONATE 200 MG/ML
200 INJECTION, SOLUTION INTRAMUSCULAR
Qty: 2 ML | Refills: 5 | Status: SHIPPED | OUTPATIENT
Start: 2020-12-30 | End: 2021-08-17

## 2020-12-30 NOTE — PROGRESS NOTES
" Subjective   Rob Josh Serrato is a 55 y.o. male.     Chief Complaint   Patient presents with   • Med Refill             History of Present Illness     Cc ed and testosterone def    Patient on testosterone for hypogonadism. For months, more than 50% of time, he can not achieve erection.  The desire is present.  He smokes vapor cig  He is on adalat for headaches.  He still has headaches.  Dr gomez believes reason for harmon's mostly sinus.   bp today 112/66.  Working a lot, feels good.  Pleased with his income.   Also  He has b12 and folate deficiency. Dr gomez wants him to have egd to make sure stomach is ok.  Patient had nissen 5 yrs ago and has no gi complaints.     Review of Systems   Constitutional: Negative for chills, fatigue and fever.   HENT: Negative for congestion, ear discharge, ear pain, facial swelling, hearing loss, postnasal drip, rhinorrhea, sinus pressure, sore throat, trouble swallowing and voice change.    Eyes: Negative for discharge, redness and visual disturbance.   Respiratory: Negative for cough, chest tightness, shortness of breath and wheezing.    Cardiovascular: Negative for chest pain and palpitations.   Gastrointestinal: Negative for abdominal pain, blood in stool, constipation, diarrhea, nausea and vomiting.   Endocrine: Negative for polydipsia and polyuria.   Genitourinary: Negative for dysuria, flank pain, hematuria and urgency.   Musculoskeletal: Negative for arthralgias, back pain, joint swelling and myalgias.   Skin: Negative for rash.   Neurological: Negative for dizziness, weakness, numbness and headaches.   Hematological: Negative for adenopathy.   Psychiatric/Behavioral: Negative for confusion and sleep disturbance. The patient is not nervous/anxious.            /66 (BP Location: Left arm, Patient Position: Sitting, Cuff Size: Adult)   Pulse 97   Temp 98.9 °F (37.2 °C) (Infrared)   Ht 162.6 cm (64.02\")   Wt 102 kg (225 lb 6.4 oz)   SpO2 98%   BMI 38.67 kg/m² "       Objective     Physical Exam  Vitals signs and nursing note reviewed.   Constitutional:       Appearance: Normal appearance. He is well-developed.   HENT:      Head: Normocephalic and atraumatic.      Right Ear: External ear normal.      Left Ear: External ear normal.      Nose: Nose normal. No rhinorrhea.   Eyes:      General: No scleral icterus.     Extraocular Movements: Extraocular movements intact.      Conjunctiva/sclera: Conjunctivae normal.      Pupils: Pupils are equal, round, and reactive to light.   Neck:      Musculoskeletal: Normal range of motion and neck supple.   Cardiovascular:      Rate and Rhythm: Normal rate and regular rhythm.      Heart sounds: Normal heart sounds. No friction rub. No gallop.    Pulmonary:      Effort: Pulmonary effort is normal.      Breath sounds: Normal breath sounds.   Abdominal:      General: Bowel sounds are normal. There is no distension.      Palpations: Abdomen is soft.      Tenderness: There is no abdominal tenderness.   Musculoskeletal: Normal range of motion.         General: No deformity.   Skin:     General: Skin is warm and dry.      Findings: No erythema or rash.   Neurological:      Mental Status: He is alert and oriented to person, place, and time.      Cranial Nerves: No cranial nerve deficit.   Psychiatric:         Behavior: Behavior normal.         Thought Content: Thought content normal.         Judgment: Judgment normal.             PAST MEDICAL HISTORY     Past Medical History:   Diagnosis Date   • Anxiety state    • Asthma    • Disorder of skeletal system    • Dyspnea    • Gastroesophageal reflux disease    • Hyperlipidemia    • Hypertension    • Malaise and fatigue    • Male hypogonadism    • Restless legs    • Sleep apnea    • Sleep apnea    • Tobacco dependence syndrome    • Ureteric stone     rt 2mm stone   • Vertigo       PAST SURGICAL HISTORY     Past Surgical History:   Procedure Laterality Date   • CHOLECYSTECTOMY     • COLONOSCOPY     •  "LAPAROSCOPY ESOPHAGOGASTRIC FUNDOPLASTY HYBRID  03/09/2010    Fundoplasty, laparoscopic (1)      SOCIAL HISTORY     Social History     Socioeconomic History   • Marital status:      Spouse name: Not on file   • Number of children: Not on file   • Years of education: Not on file   • Highest education level: Not on file   Occupational History   • Occupation:    Tobacco Use   • Smoking status: Current Every Day Smoker     Years: 38.00     Types: Electronic Cigarette, Cigarettes     Start date: 12/30/1982   • Smokeless tobacco: Never Used   • Tobacco comment: 03/09/2020 - Patient states he began utilization of Cigarettes at the age of 17 and utilized 1 - 1 1/2 packs(s) of Cigarettes per day.  Patient states he ceased utilization of Cigarettes and began utilization of Electronic Cigarette 2 years prio.   Substance and Sexual Activity   • Alcohol use: Yes     Comment: 03/09/2020 - Patient states \"occasional\" consumption of alcoholic beverages.   • Drug use: No   • Sexual activity: Defer      ALLERGIES   Codeine and Penicillins   MEDICATIONS     Current Outpatient Medications   Medication Sig Dispense Refill   • B-D 3CC LUER-RODERICK SYR 25GX1/2\" 25G X 1-1/2\" 3 ML misc      • dicyclomine (BENTYL) 20 MG tablet TAKE 1 TABLET BY MOUTH EVERY 6 (SIX) HOURS. 360 tablet 2   • folic acid (FOLVITE) 1 MG tablet TAKE 1 TABLET BY MOUTH DAILY. 90 tablet 2   • lisinopril (PRINIVIL,ZESTRIL) 20 MG tablet TAKE 1 TABLET BY MOUTH DAILY. 30 tablet 10   • meclizine (ANTIVERT) 25 MG tablet Take 1 tablet by mouth 3 (Three) Times a Day As Needed for dizziness. 90 tablet 0   • Probiotic Product ([employer]) capsule Take 1 capsule by mouth Daily. 30 capsule 11   • sertraline (ZOLOFT) 50 MG tablet TAKE 1 TABLET BY MOUTH DAILY. 30 tablet 10   • Testosterone Cypionate (DEPOTESTOTERONE CYPIONATE) 200 MG/ML injection Inject 1 mL into the appropriate muscle as directed by prescriber Every 14 (Fourteen) Days. 2 mL 5   • " venlafaxine XR (EFFEXOR-XR) 150 MG 24 hr capsule TAKE 1 CAPSULE BY MOUTH DAILY 30 capsule 10   • vitamin B-12 (CYANOCOBALAMIN) 1000 MCG tablet Take 1,000 mcg by mouth Daily.     • colesevelam (WELCHOL) 625 MG tablet TAKE 3 TABLETS BY MOUTH 2 (TWO) TIMES A DAY WITH MEALS. 180 tablet 10   • fluticasone (FLONASE) 50 MCG/ACT nasal spray 2 sprays into the nostril(s) as directed by provider 2 (Two) Times a Day. 16 g 5   • tiZANidine (ZANAFLEX) 4 MG tablet Take 1 tablet by mouth Every 6 (Six) Hours As Needed for Muscle Spasms. 60 tablet 11     Current Facility-Administered Medications   Medication Dose Route Frequency Provider Last Rate Last Admin   • cyanocobalamin injection 1,000 mcg  1,000 mcg Intramuscular Q28 Days Cb Weaver MD   1,000 mcg at 03/26/19 0957        The following portions of the patient's history were reviewed and updated as appropriate: allergies, current medications, past family history, past medical history, past social history, past surgical history and problem list.        Assessment/Plan   Diagnoses and all orders for this visit:    1. B12 deficiency (Primary)  -     Vitamin B12  -     Intrinsic Factor Ab  -     Ambulatory Referral to Gastroenterology    2. Male hypogonadism  -     Testosterone Cypionate (DEPOTESTOTERONE CYPIONATE) 200 MG/ML injection; Inject 1 mL into the appropriate muscle as directed by prescriber Every 14 (Fourteen) Days.  Dispense: 2 mL; Refill: 5  -     Testosterone    3. Folate deficiency  -     Folate  -     Ambulatory Referral to Gastroenterology    4. Screening for prostate cancer  -     PSA Screen    5. Essential hypertension  -     CBC (No Diff)  -     Comprehensive Metabolic Panel  -     Lipid Panel    6. Erectile dysfunction, unspecified erectile dysfunction type    7. History of Nissen fundoplication  -     Ambulatory Referral to Gastroenterology    for his ed, will stop adalat and see if helps his ability to have erections.  If not will give viagra. Already  discussed side effects and warnings.  Return bp check 1-2 weeks.     Ill ask gi if egd recommended given no symptoms.   I added intrinsic factor after initial blood draw.     Refilled testosterone.  He is due for his shot today.  This will give me a dave.     Will refer to gi for probable egd.  I don't see he has had a colonoscopy either.                  No follow-ups on file.                  This document has been electronically signed by Cb Weaver MD on December 30, 2020 12:12 CST

## 2020-12-31 LAB
ALBUMIN SERPL-MCNC: 4.7 G/DL (ref 3.5–5.2)
ALBUMIN/GLOB SERPL: 1.8 G/DL
ALP SERPL-CCNC: 101 U/L (ref 39–117)
ALT SERPL W P-5'-P-CCNC: 13 U/L (ref 1–41)
ANION GAP SERPL CALCULATED.3IONS-SCNC: 10 MMOL/L (ref 5–15)
AST SERPL-CCNC: 14 U/L (ref 1–40)
BILIRUB SERPL-MCNC: 0.5 MG/DL (ref 0–1.2)
BUN SERPL-MCNC: 18 MG/DL (ref 6–20)
BUN/CREAT SERPL: 17.3 (ref 7–25)
CALCIUM SPEC-SCNC: 9.6 MG/DL (ref 8.6–10.5)
CHLORIDE SERPL-SCNC: 100 MMOL/L (ref 98–107)
CHOLEST SERPL-MCNC: 226 MG/DL (ref 0–200)
CO2 SERPL-SCNC: 26 MMOL/L (ref 22–29)
CREAT SERPL-MCNC: 1.04 MG/DL (ref 0.76–1.27)
DEPRECATED RDW RBC AUTO: 38.9 FL (ref 37–54)
ERYTHROCYTE [DISTWIDTH] IN BLOOD BY AUTOMATED COUNT: 12.8 % (ref 12.3–15.4)
FOLATE SERPL-MCNC: 9.66 NG/ML (ref 4.78–24.2)
GFR SERPL CREATININE-BSD FRML MDRD: 74 ML/MIN/1.73
GLOBULIN UR ELPH-MCNC: 2.6 GM/DL
GLUCOSE SERPL-MCNC: 69 MG/DL (ref 65–99)
HCT VFR BLD AUTO: 39.8 % (ref 37.5–51)
HDLC SERPL-MCNC: 38 MG/DL (ref 40–60)
HGB BLD-MCNC: 13.9 G/DL (ref 13–17.7)
LDLC SERPL CALC-MCNC: 97 MG/DL (ref 0–100)
LDLC/HDLC SERPL: 2.09 {RATIO}
MCH RBC QN AUTO: 29.3 PG (ref 26.6–33)
MCHC RBC AUTO-ENTMCNC: 34.9 G/DL (ref 31.5–35.7)
MCV RBC AUTO: 84 FL (ref 79–97)
PLATELET # BLD AUTO: 320 10*3/MM3 (ref 140–450)
PMV BLD AUTO: 11.5 FL (ref 6–12)
POTASSIUM SERPL-SCNC: 4.5 MMOL/L (ref 3.5–5.2)
PROT SERPL-MCNC: 7.3 G/DL (ref 6–8.5)
PSA SERPL-MCNC: 0.25 NG/ML (ref 0–4)
RBC # BLD AUTO: 4.74 10*6/MM3 (ref 4.14–5.8)
SODIUM SERPL-SCNC: 136 MMOL/L (ref 136–145)
TESTOST SERPL-MCNC: 122 NG/DL (ref 193–740)
TRIGL SERPL-MCNC: 542 MG/DL (ref 0–150)
VIT B12 BLD-MCNC: 480 PG/ML (ref 211–946)
VLDLC SERPL-MCNC: 91 MG/DL (ref 5–40)
WBC # BLD AUTO: 8.98 10*3/MM3 (ref 3.4–10.8)

## 2021-01-06 DIAGNOSIS — E78.1 HYPERTRIGLYCERIDEMIA: ICD-10-CM

## 2021-01-06 DIAGNOSIS — E78.2 ELEVATED TRIGLYCERIDES WITH HIGH CHOLESTEROL: Primary | ICD-10-CM

## 2021-01-25 ENCOUNTER — LAB (OUTPATIENT)
Dept: LAB | Facility: HOSPITAL | Age: 56
End: 2021-01-25

## 2021-01-25 DIAGNOSIS — E78.1 HYPERTRIGLYCERIDEMIA: ICD-10-CM

## 2021-01-25 LAB
CHOLEST SERPL-MCNC: 199 MG/DL (ref 0–200)
HDLC SERPL-MCNC: 44 MG/DL (ref 40–60)
LDLC SERPL CALC-MCNC: 124 MG/DL (ref 0–100)
LDLC/HDLC SERPL: 2.74 {RATIO}
TRIGL SERPL-MCNC: 172 MG/DL (ref 0–150)
VLDLC SERPL-MCNC: 31 MG/DL (ref 5–40)

## 2021-01-25 PROCEDURE — 80061 LIPID PANEL: CPT

## 2021-01-25 PROCEDURE — 86340 INTRINSIC FACTOR ANTIBODY: CPT | Performed by: FAMILY MEDICINE

## 2021-01-27 LAB — IF BLOCK AB SER-ACNC: 1 AU/ML (ref 0–1.1)

## 2021-02-18 ENCOUNTER — OFFICE VISIT (OUTPATIENT)
Dept: SLEEP MEDICINE | Facility: HOSPITAL | Age: 56
End: 2021-02-18

## 2021-02-18 DIAGNOSIS — G47.33 OBSTRUCTIVE SLEEP APNEA, ADULT: Primary | ICD-10-CM

## 2021-02-18 DIAGNOSIS — G25.81 RESTLESS LEGS SYNDROME (RLS): ICD-10-CM

## 2021-02-18 PROCEDURE — 99441 PR PHYS/QHP TELEPHONE EVALUATION 5-10 MIN: CPT | Performed by: NURSE PRACTITIONER

## 2021-02-18 NOTE — PROGRESS NOTES
Sleep Clinic Follow Up - Telephone Visit      You have chosen to receive care through a telephone visit. Do you consent to use a telephone visit for your medical care today? Yes       Date: 2021  Primary Care Provider: Cb Weaver MD    Last office visit: 2019 (I reviewed this note)    CC: Follow up: JOSSELINE on CPAP      Interim History:  Since the last visit:    1) JOSSELINE -  Rob Serrato has reportedly remained compliant with CPAP. He denies mask and machine issues, dry mouth, headaches, or pressures intolerance. He denies abnormal dreams, sleep paralysis, nasal congestion, URI sx.    Sleep Testin. PSG on 2010, AHI of 103  2. CPAP titration on 2010, recommended 14 cm H2O   3. Currently on 14 cm H2O (according to last OV)     PAP Data:  Patient to take data card to DME   Mask type: Full face mask  DME: [facility]     Patient works third shift. He stays on night shift schedule on days off.   Bed time: 9149-3367  Sleep latency: 15-20 minutes  Number of times awakens during the night: 0-1  Wake time: 2000  Estimated total sleep time at night: 7-8 hours  Caffeine intake: 0 cups of coffee, 32-40oz of tea, and 0-1 sodas per day  Alcohol intake: 0 drinks per week  Nap time: denies   Sleepiness with Driving: denies        2) Patient reports occasional RLS symptoms. Symptoms do not keep him awake.     PMHx, FH, SH reviewed and pertinent changes are:  unchanged from last office visit on 2019      REVIEW OF SYSTEMS:   Negative for chest pain, SOA, fever, chills, cough, N/V/D, abdominal pain.    Smoking: Current every day smoker. He states hard to tell how much he smokes because he uses an electronic cigarette.     Rob Serrato  reports that he has been smoking electronic cigarette and cigarettes. He started smoking about 38 years ago. He has smoked for the past 38.00 years. He has never used smokeless tobacco.. I have educated him on the risk of diseases from using tobacco products such  "as cancer, COPD, heart disease.     I advised him to quit and he is not willing to quit and this time.     I spent 2 minutes counseling this patient regarding tobacco use.          Patient's BMI is 38.67.  BMI is above normal parameters. Recommendations include: referral to primary care.        Exam:  Unable to perform physical exam due to conducting telephone visit    Past Medical History:   Diagnosis Date   • Anxiety state    • Asthma    • Disorder of skeletal system    • Dyspnea    • Gastroesophageal reflux disease    • Hyperlipidemia    • Hypertension    • Malaise and fatigue    • Male hypogonadism    • Restless legs    • Sleep apnea    • Sleep apnea    • Tobacco dependence syndrome    • Ureteric stone     rt 2mm stone   • Vertigo        Current Outpatient Medications:   •  B-D 3CC LUER-RODERICK SYR 25GX1/2\" 25G X 1-1/2\" 3 ML misc, , Disp: , Rfl:   •  colesevelam (WELCHOL) 625 MG tablet, TAKE 3 TABLETS BY MOUTH 2 (TWO) TIMES A DAY WITH MEALS., Disp: 180 tablet, Rfl: 10  •  dicyclomine (BENTYL) 20 MG tablet, TAKE 1 TABLET BY MOUTH EVERY 6 (SIX) HOURS., Disp: 360 tablet, Rfl: 2  •  fluticasone (FLONASE) 50 MCG/ACT nasal spray, 2 sprays into the nostril(s) as directed by provider 2 (Two) Times a Day., Disp: 16 g, Rfl: 5  •  folic acid (FOLVITE) 1 MG tablet, TAKE 1 TABLET BY MOUTH DAILY., Disp: 90 tablet, Rfl: 2  •  lisinopril (PRINIVIL,ZESTRIL) 20 MG tablet, TAKE 1 TABLET BY MOUTH DAILY., Disp: 30 tablet, Rfl: 10  •  meclizine (ANTIVERT) 25 MG tablet, Take 1 tablet by mouth 3 (Three) Times a Day As Needed for dizziness., Disp: 90 tablet, Rfl: 0  •  Probiotic Product (Couch[employer]) capsule, Take 1 capsule by mouth Daily., Disp: 30 capsule, Rfl: 11  •  sertraline (ZOLOFT) 50 MG tablet, TAKE 1 TABLET BY MOUTH DAILY., Disp: 30 tablet, Rfl: 10  •  Testosterone Cypionate (DEPOTESTOTERONE CYPIONATE) 200 MG/ML injection, Inject 1 mL into the appropriate muscle as directed by prescriber Every 14 (Fourteen) Days., Disp: " 2 mL, Rfl: 5  •  tiZANidine (ZANAFLEX) 4 MG tablet, Take 1 tablet by mouth Every 6 (Six) Hours As Needed for Muscle Spasms., Disp: 60 tablet, Rfl: 11  •  venlafaxine XR (EFFEXOR-XR) 150 MG 24 hr capsule, TAKE 1 CAPSULE BY MOUTH DAILY, Disp: 30 capsule, Rfl: 10  •  vitamin B-12 (CYANOCOBALAMIN) 1000 MCG tablet, Take 1,000 mcg by mouth Daily., Disp: , Rfl:     Current Facility-Administered Medications:   •  cyanocobalamin injection 1,000 mcg, 1,000 mcg, Intramuscular, Q28 Days, Cb Weaver MD, 1,000 mcg at 03/26/19 0957      Assessment and Plan:    1. Obstructive sleep apnea - Established, stable   1. Reportedly compliant with PAP therapy, obtain compliance report from DME   2. Continue PAP as prescribed  3. Script for PAP supplies  4. Return to clinic in 12 months with compliance report unless change in symptoms in interim period  2. Restless leg syndrome/Periodic limb movement disorder (RLS/PLMD) - Self-limiting or minor   1. Non-pharmacological treatments discussed   8. Nicotine dependence without complication   1.  Smoking and tobacco use cessation counseling visit was 2 minutes.     This visit has been rescheduled as a phone visit to comply with patient safety concerns in accordance with CDC recommendations. Total time of discussion was 7 minutes.    4 of 7 minutes spent telephone counseling patient extensively regarding:   PAP management, compliance, maintenance, smoking cessation       Return to clinic in 12 months. Patient agrees to return sooner if changes in symptoms.            This document has been electronically signed by KISHA Giles on February 18, 2021 10:02 CST              CC: Cb Weaver MD          No ref. provider found

## 2021-02-23 ENCOUNTER — DOCUMENTATION (OUTPATIENT)
Dept: SLEEP MEDICINE | Facility: HOSPITAL | Age: 56
End: 2021-02-23

## 2021-02-23 NOTE — PROGRESS NOTES
PAP Data:    Time frame: 11/26/2020-02/23/2021   Compliance 99 %  Average use on days used: 7hrs 49 min  Percent of days with usage greater than or equal to 4 hours: 98%  PAP range : 12-20 cm H2O  Average 90% pressure: 12.2 cmH2O  Leak: 104.6L/ minutes  Average AHI 0.8 events/hr

## 2021-04-19 RX ORDER — VENLAFAXINE HYDROCHLORIDE 150 MG/1
CAPSULE, EXTENDED RELEASE ORAL
Qty: 30 CAPSULE | Refills: 10 | Status: SHIPPED | OUTPATIENT
Start: 2021-04-19 | End: 2022-05-09

## 2021-04-22 ENCOUNTER — LAB (OUTPATIENT)
Dept: LAB | Facility: HOSPITAL | Age: 56
End: 2021-04-22

## 2021-04-22 ENCOUNTER — OFFICE VISIT (OUTPATIENT)
Dept: FAMILY MEDICINE CLINIC | Facility: CLINIC | Age: 56
End: 2021-04-22

## 2021-04-22 VITALS
WEIGHT: 222.4 LBS | DIASTOLIC BLOOD PRESSURE: 68 MMHG | HEIGHT: 64 IN | BODY MASS INDEX: 37.97 KG/M2 | SYSTOLIC BLOOD PRESSURE: 120 MMHG | OXYGEN SATURATION: 97 % | TEMPERATURE: 98.4 F | HEART RATE: 98 BPM

## 2021-04-22 DIAGNOSIS — M25.561 ACUTE PAIN OF BOTH KNEES: Primary | ICD-10-CM

## 2021-04-22 DIAGNOSIS — R20.2 PARESTHESIA: ICD-10-CM

## 2021-04-22 DIAGNOSIS — M25.562 ACUTE PAIN OF BOTH KNEES: Primary | ICD-10-CM

## 2021-04-22 LAB — ERYTHROCYTE [SEDIMENTATION RATE] IN BLOOD: 7 MM/HR (ref 0–15)

## 2021-04-22 PROCEDURE — 84425 ASSAY OF VITAMIN B-1: CPT | Performed by: FAMILY MEDICINE

## 2021-04-22 PROCEDURE — 84550 ASSAY OF BLOOD/URIC ACID: CPT | Performed by: FAMILY MEDICINE

## 2021-04-22 PROCEDURE — 85651 RBC SED RATE NONAUTOMATED: CPT | Performed by: FAMILY MEDICINE

## 2021-04-22 PROCEDURE — 82746 ASSAY OF FOLIC ACID SERUM: CPT | Performed by: FAMILY MEDICINE

## 2021-04-22 PROCEDURE — 84443 ASSAY THYROID STIM HORMONE: CPT | Performed by: FAMILY MEDICINE

## 2021-04-22 PROCEDURE — 99213 OFFICE O/P EST LOW 20 MIN: CPT | Performed by: FAMILY MEDICINE

## 2021-04-22 PROCEDURE — 86140 C-REACTIVE PROTEIN: CPT | Performed by: FAMILY MEDICINE

## 2021-04-22 PROCEDURE — 84207 ASSAY OF VITAMIN B-6: CPT | Performed by: FAMILY MEDICINE

## 2021-04-22 PROCEDURE — 96372 THER/PROPH/DIAG INJ SC/IM: CPT | Performed by: FAMILY MEDICINE

## 2021-04-22 PROCEDURE — 82607 VITAMIN B-12: CPT | Performed by: FAMILY MEDICINE

## 2021-04-22 RX ORDER — TRIAMCINOLONE ACETONIDE 40 MG/ML
80 INJECTION, SUSPENSION INTRA-ARTICULAR; INTRAMUSCULAR ONCE
Status: COMPLETED | OUTPATIENT
Start: 2021-04-22 | End: 2021-04-22

## 2021-04-22 RX ORDER — MELOXICAM 15 MG/1
15 TABLET ORAL DAILY
Qty: 30 TABLET | Refills: 11 | Status: SHIPPED | OUTPATIENT
Start: 2021-04-22 | End: 2022-05-09

## 2021-04-22 RX ADMIN — TRIAMCINOLONE ACETONIDE 80 MG: 40 INJECTION, SUSPENSION INTRA-ARTICULAR; INTRAMUSCULAR at 14:25

## 2021-04-22 NOTE — PATIENT INSTRUCTIONS
"BMI for Adults  What is BMI?  Body mass index (BMI) is a number that is calculated from a person's weight and height. BMI can help estimate how much of a person's weight is composed of fat. BMI does not measure body fat directly. Rather, it is an alternative to procedures that directly measure body fat, which can be difficult and expensive.  BMI can help identify people who may be at higher risk for certain medical problems.  What are BMI measurements used for?  BMI is used as a screening tool to identify possible weight problems. It helps determine whether a person is obese, overweight, a healthy weight, or underweight.  BMI is useful for:  · Identifying a weight problem that may be related to a medical condition or may increase the risk for medical problems.  · Promoting changes, such as changes in diet and exercise, to help reach a healthy weight. BMI screening can be repeated to see if these changes are working.  How is BMI calculated?  BMI involves measuring your weight in relation to your height. Both height and weight are measured, and the BMI is calculated from those numbers. This can be done either in English (U[place] or metric measurements. Note that charts and online BMI calculators are available to help you find your BMI quickly and easily without having to do these calculations yourself.  To calculate your BMI in English ([place] measurements:    1. Measure your weight in pounds (lb).  2. Multiply the number of pounds by 703.  ? For example, for a person who weighs 180 lb, multiply that number by 703, which equals 126,540.  3. Measure your height in inches. Then multiply that number by itself to get a measurement called \"inches squared.\"  ? For example, for a person who is 70 inches tall, the \"inches squared\" measurement is 70 inches x 70 inches, which equals 4,900 inches squared.  4. Divide the total from step 2 (number of lb x 703) by the total from step 3 (inches squared): 126,540 ÷ 4,900 = 25.8. This is " "your BMI.  To calculate your BMI in metric measurements:  1. Measure your weight in kilograms (kg).  2. Measure your height in meters (m). Then multiply that number by itself to get a measurement called \"meters squared.\"  ? For example, for a person who is 1.75 m tall, the \"meters squared\" measurement is 1.75 m x 1.75 m, which is equal to 3.1 meters squared.  3. Divide the number of kilograms (your weight) by the meters squared number. In this example: 70 ÷ 3.1 = 22.6. This is your BMI.  What do the results mean?  BMI charts are used to identify whether you are underweight, normal weight, overweight, or obese. The following guidelines will be used:  · Underweight: BMI less than 18.5.  · Normal weight: BMI between 18.5 and 24.9.  · Overweight: BMI between 25 and 29.9.  · Obese: BMI of 30 or above.  Keep these notes in mind:  · Weight includes both fat and muscle, so someone with a muscular build, such as an athlete, may have a BMI that is higher than 24.9. In cases like these, BMI is not an accurate measure of body fat.  · To determine if excess body fat is the cause of a BMI of 25 or higher, further assessments may need to be done by a health care provider.  · BMI is usually interpreted in the same way for men and women.  Where to find more information  For more information about BMI, including tools to quickly calculate your BMI, go to these websites:  · Centers for Disease Control and Prevention: www.cdc.gov  · American Heart Association: www.heart.org  · National Heart, Lung, and Blood [place]: www.nhlbi.nih.gov  Summary  · Body mass index (BMI) is a number that is calculated from a person's weight and height.  · BMI may help estimate how much of a person's weight is composed of fat. BMI can help identify those who may be at higher risk for certain medical problems.  · BMI can be measured using English measurements or metric measurements.  · BMI charts are used to identify whether you are underweight, normal " weight, overweight, or obese.  This information is not intended to replace advice given to you by your health care provider. Make sure you discuss any questions you have with your health care provider.  Document Revised: 09/09/2020 Document Reviewed: 07/17/2020  Elsevier Patient Education © 2021 [employer]

## 2021-04-22 NOTE — PROGRESS NOTES
" Subjective   Rob Josh Serrato is a 55 y.o. male.     Chief Complaint   Patient presents with   • Knee Pain             History of Present Illness     Both knees feel like hot pokers sticking in them and he points to medial knees. Ongoing since December.  Working 6 to 7 days a week 12 hours on feet. His right patella popped out once and he put it back.    He says he is taking  His b12    Review of Systems   Constitutional: Negative for chills, fatigue and fever.   HENT: Negative for congestion, ear discharge, ear pain, facial swelling, hearing loss, postnasal drip, rhinorrhea, sinus pressure, sore throat, trouble swallowing and voice change.    Eyes: Negative for discharge, redness and visual disturbance.   Respiratory: Negative for cough, chest tightness, shortness of breath and wheezing.    Cardiovascular: Negative for chest pain and palpitations.   Gastrointestinal: Negative for abdominal pain, blood in stool, constipation, diarrhea, nausea and vomiting.   Endocrine: Negative for polydipsia and polyuria.   Genitourinary: Negative for dysuria, flank pain, hematuria and urgency.   Musculoskeletal: Positive for arthralgias. Negative for back pain, joint swelling and myalgias.   Skin: Negative for rash.   Neurological: Negative for dizziness, weakness, numbness and headaches.   Hematological: Negative for adenopathy.   Psychiatric/Behavioral: Negative for confusion and sleep disturbance. The patient is not nervous/anxious.            /68 (BP Location: Left arm, Patient Position: Sitting, Cuff Size: Adult)   Pulse 98   Temp 98.4 °F (36.9 °C) (Infrared)   Ht 162.6 cm (64.02\")   Wt 101 kg (222 lb 6.4 oz)   SpO2 97%   BMI 38.16 kg/m²       Objective     Physical Exam  Vitals and nursing note reviewed.   Constitutional:       Appearance: Normal appearance. He is well-developed.   HENT:      Head: Normocephalic and atraumatic.      Right Ear: External ear normal.      Left Ear: External ear normal.      Nose: " Nose normal.   Eyes:      Extraocular Movements: Extraocular movements intact.      Conjunctiva/sclera: Conjunctivae normal.      Pupils: Pupils are equal, round, and reactive to light.   Pulmonary:      Effort: Pulmonary effort is normal.   Musculoskeletal:         General: Normal range of motion.      Cervical back: Normal range of motion.      Comments: Knees no swelling or redness.    Neurological:      General: No focal deficit present.      Mental Status: He is alert and oriented to person, place, and time.   Psychiatric:         Behavior: Behavior normal.         Thought Content: Thought content normal.         Judgment: Judgment normal.             PAST MEDICAL HISTORY     Past Medical History:   Diagnosis Date   • Anxiety state    • Asthma    • Disorder of skeletal system    • Dyspnea    • Gastroesophageal reflux disease    • Hyperlipidemia    • Hypertension    • Malaise and fatigue    • Male hypogonadism    • Restless legs    • Sleep apnea    • Sleep apnea    • Tobacco dependence syndrome    • Ureteric stone     rt 2mm stone   • Vertigo       PAST SURGICAL HISTORY     Past Surgical History:   Procedure Laterality Date   • CHOLECYSTECTOMY     • COLONOSCOPY     • LAPAROSCOPY ESOPHAGOGASTRIC FUNDOPLASTY HYBRID  03/09/2010    Fundoplasty, laparoscopic (1)      SOCIAL HISTORY     Social History     Socioeconomic History   • Marital status:      Spouse name: Not on file   • Number of children: Not on file   • Years of education: Not on file   • Highest education level: Not on file   Tobacco Use   • Smoking status: Current Every Day Smoker     Years: 38.00     Types: Electronic Cigarette, Cigarettes     Start date: 12/30/1982   • Smokeless tobacco: Never Used   • Tobacco comment: 03/09/2020 - Patient states he began utilization of Cigarettes at the age of 17 and utilized 1 - 1 1/2 packs(s) of Cigarettes per day.  Patient states he ceased utilization of Cigarettes and began utilization of Electronic  "Cigarette 2 years prio.   Substance and Sexual Activity   • Alcohol use: Yes     Comment: 03/09/2020 - Patient states \"occasional\" consumption of alcoholic beverages.   • Drug use: No   • Sexual activity: Defer      ALLERGIES   Codeine and Penicillins   MEDICATIONS     Current Outpatient Medications   Medication Sig Dispense Refill   • B-D 3CC LUER-RODERICK SYR 25GX1/2\" 25G X 1-1/2\" 3 ML misc      • colesevelam (WELCHOL) 625 MG tablet TAKE 3 TABLETS BY MOUTH 2 (TWO) TIMES A DAY WITH MEALS. 180 tablet 10   • dicyclomine (BENTYL) 20 MG tablet TAKE 1 TABLET BY MOUTH EVERY 6 (SIX) HOURS. 360 tablet 2   • folic acid (FOLVITE) 1 MG tablet TAKE 1 TABLET BY MOUTH DAILY. 90 tablet 2   • lisinopril (PRINIVIL,ZESTRIL) 20 MG tablet TAKE 1 TABLET BY MOUTH DAILY. 30 tablet 10   • meclizine (ANTIVERT) 25 MG tablet Take 1 tablet by mouth 3 (Three) Times a Day As Needed for dizziness. 90 tablet 0   • Probiotic Product (SeatKarma) capsule Take 1 capsule by mouth Daily. 30 capsule 11   • sertraline (ZOLOFT) 50 MG tablet TAKE 1 TABLET BY MOUTH DAILY. 30 tablet 10   • Testosterone Cypionate (DEPOTESTOTERONE CYPIONATE) 200 MG/ML injection Inject 1 mL into the appropriate muscle as directed by prescriber Every 14 (Fourteen) Days. 2 mL 5   • tiZANidine (ZANAFLEX) 4 MG tablet Take 1 tablet by mouth Every 6 (Six) Hours As Needed for Muscle Spasms. 60 tablet 11   • venlafaxine XR (EFFEXOR-XR) 150 MG 24 hr capsule TAKE 1 CAPSULE BY MOUTH DAILY 30 capsule 10   • vitamin B-12 (CYANOCOBALAMIN) 1000 MCG tablet Take 1,000 mcg by mouth Daily.     • fluticasone (FLONASE) 50 MCG/ACT nasal spray 2 sprays into the nostril(s) as directed by provider 2 (Two) Times a Day. 16 g 5   • meloxicam (Mobic) 15 MG tablet Take 1 tablet by mouth Daily. 30 tablet 11     Current Facility-Administered Medications   Medication Dose Route Frequency Provider Last Rate Last Admin   • cyanocobalamin injection 1,000 mcg  1,000 mcg Intramuscular Q28 Days Cb Weaver " MD WALTER   1,000 mcg at 03/26/19 0925        The following portions of the patient's history were reviewed and updated as appropriate: allergies, current medications, past family history, past medical history, past social history, past surgical history and problem list.        Assessment/Plan   Diagnoses and all orders for this visit:    1. Acute pain of both knees (Primary)  -     XR Knee Bilateral AP Standing  -     XR knee 1 or 2 vw bilateral  -     triamcinolone acetonide (KENALOG-40) injection 80 mg    2. Paresthesia  -     Folate  -     TSH  -     Uric Acid  -     Vitamin B1, Whole Blood  -     Vitamin B12  -     Vitamin B6  -     C-reactive Protein  -     Sedimentation Rate    Other orders  -     meloxicam (Mobic) 15 MG tablet; Take 1 tablet by mouth Daily.  Dispense: 30 tablet; Refill: 11      Xray:  Mild degenerative changes medial compartment both knees.     Over use most likely.    Lab work above.   He cant take time off to rest his knees.     If labwork ok, in future may inject both knees.     Renal function is good, try mobic with food.                  No follow-ups on file.                  This document has been electronically signed by Cb Weaver MD on April 22, 2021 14:11 CDT

## 2021-04-24 LAB
CRP SERPL-MCNC: <0.3 MG/DL (ref 0–0.5)
FOLATE SERPL-MCNC: 13.3 NG/ML (ref 4.78–24.2)
TSH SERPL DL<=0.05 MIU/L-ACNC: 1.28 UIU/ML (ref 0.27–4.2)
URATE SERPL-MCNC: 9.1 MG/DL (ref 3.4–7)
VIT B12 BLD-MCNC: 492 PG/ML (ref 211–946)

## 2021-04-28 LAB — VIT B6 SERPL-MCNC: 12 UG/L (ref 5.3–46.7)

## 2021-04-29 LAB — VIT B1 BLD-SCNC: 102.2 NMOL/L (ref 66.5–200)

## 2021-07-06 RX ORDER — DICYCLOMINE HCL 20 MG
20 TABLET ORAL EVERY 6 HOURS
Qty: 360 TABLET | Refills: 2 | Status: SHIPPED | OUTPATIENT
Start: 2021-07-06 | End: 2022-07-07

## 2021-08-17 ENCOUNTER — TELEPHONE (OUTPATIENT)
Dept: FAMILY MEDICINE CLINIC | Facility: CLINIC | Age: 56
End: 2021-08-17

## 2021-08-17 DIAGNOSIS — E29.1 MALE HYPOGONADISM: ICD-10-CM

## 2021-08-17 RX ORDER — TESTOSTERONE CYPIONATE 200 MG/ML
200 INJECTION, SOLUTION INTRAMUSCULAR
Qty: 2 ML | Refills: 4 | Status: SHIPPED | OUTPATIENT
Start: 2021-08-17 | End: 2022-07-26 | Stop reason: SDUPTHER

## 2021-08-17 NOTE — TELEPHONE ENCOUNTER
Mr. Hatton called and needs a refill on his Testosterone Cypionate 200 MG/ML injection intramuscular q14 days-  Cedric

## 2021-09-02 RX ORDER — FOLIC ACID 1 MG/1
1 TABLET ORAL DAILY
Qty: 90 TABLET | Refills: 3 | Status: SHIPPED | OUTPATIENT
Start: 2021-09-02 | End: 2022-09-23

## 2022-03-24 RX ORDER — LISINOPRIL 20 MG/1
20 TABLET ORAL DAILY
Qty: 30 TABLET | Refills: 1 | Status: SHIPPED | OUTPATIENT
Start: 2022-03-24 | End: 2022-06-02

## 2022-05-09 RX ORDER — MELOXICAM 15 MG/1
15 TABLET ORAL DAILY
Qty: 30 TABLET | Refills: 10 | Status: SHIPPED | OUTPATIENT
Start: 2022-05-09

## 2022-05-09 RX ORDER — VENLAFAXINE HYDROCHLORIDE 150 MG/1
CAPSULE, EXTENDED RELEASE ORAL
Qty: 30 CAPSULE | Refills: 9 | Status: SHIPPED | OUTPATIENT
Start: 2022-05-09 | End: 2022-07-26 | Stop reason: DRUGHIGH

## 2022-06-02 RX ORDER — LISINOPRIL 20 MG/1
20 TABLET ORAL DAILY
Qty: 30 TABLET | Refills: 0 | Status: SHIPPED | OUTPATIENT
Start: 2022-06-02 | End: 2022-07-21

## 2022-06-17 RX ORDER — COLESEVELAM 180 1/1
1875 TABLET ORAL 2 TIMES DAILY WITH MEALS
Qty: 180 TABLET | Refills: 0 | Status: SHIPPED | OUTPATIENT
Start: 2022-06-17 | End: 2022-07-26 | Stop reason: SDUPTHER

## 2022-06-21 ENCOUNTER — OFFICE VISIT (OUTPATIENT)
Dept: FAMILY MEDICINE CLINIC | Facility: CLINIC | Age: 57
End: 2022-06-21

## 2022-06-21 VITALS
OXYGEN SATURATION: 99 % | DIASTOLIC BLOOD PRESSURE: 83 MMHG | SYSTOLIC BLOOD PRESSURE: 150 MMHG | BODY MASS INDEX: 39.01 KG/M2 | TEMPERATURE: 99.1 F | WEIGHT: 228.5 LBS | HEIGHT: 64 IN | HEART RATE: 90 BPM

## 2022-06-21 DIAGNOSIS — M79.672 LEFT FOOT PAIN: Primary | ICD-10-CM

## 2022-06-21 PROCEDURE — 96372 THER/PROPH/DIAG INJ SC/IM: CPT | Performed by: NURSE PRACTITIONER

## 2022-06-21 PROCEDURE — 99213 OFFICE O/P EST LOW 20 MIN: CPT | Performed by: NURSE PRACTITIONER

## 2022-06-21 RX ORDER — IBUPROFEN 600 MG/1
600 TABLET ORAL EVERY 8 HOURS PRN
Qty: 60 TABLET | Refills: 0 | COMMUNITY
Start: 2022-06-21 | End: 2022-07-26

## 2022-06-21 RX ORDER — TRIAMCINOLONE ACETONIDE 40 MG/ML
80 INJECTION, SUSPENSION INTRA-ARTICULAR; INTRAMUSCULAR ONCE
Status: COMPLETED | OUTPATIENT
Start: 2022-06-21 | End: 2022-06-21

## 2022-06-21 RX ADMIN — TRIAMCINOLONE ACETONIDE 80 MG: 40 INJECTION, SUSPENSION INTRA-ARTICULAR; INTRAMUSCULAR at 10:47

## 2022-06-21 NOTE — PROGRESS NOTES
Injection  Injection performed in  by Elvia Saeed MA. Patient tolerated the procedure well without complications.  06/21/22   Elvia Saeed MA   --------------- APPROVED REPORT --------------





EKG Measurement

Heart [id number]

VA [id number]

[id number]

[id number]

[id number]



<Conclusion>

Sinus tachycardia

Possible Left atrial enlargement

Borderline ECG

## 2022-06-21 NOTE — PROGRESS NOTES
Subjective   Rob Serrato is a 56 y.o. male. Foot pain (left)    History of Present Illness   Patient presents today for left foot pain. He says pain started last night and woke him up many times. Denies any foot injury. His chart shows elevated uric acid level last year, he denies any history of gout. Denies any fever. He is paying out of pocket for today's visit. He will have insurance in July.     The following portions of the patient's history were reviewed and updated as appropriate: allergies, current medications, past family history, past medical history, past social history, past surgical history, and problem list.    Review of Systems   Constitutional: Negative for chills, fatigue and fever.   HENT: Negative for congestion, ear pain, rhinorrhea and sore throat.    Eyes: Negative for blurred vision, double vision and visual disturbance.   Respiratory: Negative for cough, chest tightness, shortness of breath and wheezing.    Cardiovascular: Negative for chest pain, palpitations and leg swelling.   Gastrointestinal: Negative for abdominal pain, diarrhea, nausea and vomiting.   Endocrine: Negative for cold intolerance and heat intolerance.   Genitourinary: Negative for difficulty urinating, dysuria, frequency and hematuria.   Musculoskeletal: Positive for arthralgias (left foot pain). Negative for back pain, neck pain and neck stiffness.   Skin: Negative for dry skin, pallor, rash, skin lesions and wound.   Allergic/Immunologic: Negative for environmental allergies, food allergies and immunocompromised state.   Neurological: Negative for dizziness, syncope, weakness, light-headedness, headache and confusion.   Hematological: Negative for adenopathy. Does not bruise/bleed easily.   Psychiatric/Behavioral: Negative for self-injury, sleep disturbance, suicidal ideas, depressed mood and stress. The patient is not nervous/anxious.        Vitals:    06/21/22 1001   BP: 150/83   Pulse: 90   Temp: 99.1 °F (37.3  °C)   SpO2: 99%     Body mass index is 39.2 kg/m².    Objective   Physical Exam  Vitals and nursing note reviewed.   Constitutional:       Appearance: He is well-developed.   HENT:      Head: Normocephalic.   Eyes:      Conjunctiva/sclera: Conjunctivae normal.   Musculoskeletal:         General: Normal range of motion.      Cervical back: Full passive range of motion without pain, normal range of motion and neck supple.        Feet:    Skin:     General: Skin is warm and dry.   Neurological:      Mental Status: He is alert and oriented to person, place, and time.      Coordination: Coordination normal.      Gait: Gait normal.   Psychiatric:         Speech: Speech normal.         Behavior: Behavior normal. Behavior is cooperative.         Thought Content: Thought content normal.         Judgment: Judgment normal.           Assessment & Plan   Diagnoses and all orders for this visit:    1. Left foot pain (Primary)  -     triamcinolone acetonide (KENALOG-40) injection 80 mg  -     ibuprofen (ADVIL,MOTRIN) 600 MG tablet; Take 1 tablet by mouth Every 8 (Eight) Hours As Needed for Mild Pain  or Moderate Pain .  Dispense: 60 tablet; Refill: 0    Symptoms consistent with gout.  No signs of infection noted.  Recommended lab and xray's to further evaluate, he declined due to no insurance at this time.  Kenalog 80 mg given IM in office to reduce inflammation.  Take ibuprofen 600 mg OTC PRN for pain.  If symptoms do not improve or worsen, patient was instructed to return to clinic for further evaluation.         This document has been electronically signed by KISHA Ferris on  June 21, 2022 11:17 CDT

## 2022-07-07 RX ORDER — DICYCLOMINE HCL 20 MG
20 TABLET ORAL EVERY 6 HOURS
Qty: 360 TABLET | Refills: 1 | Status: SHIPPED | OUTPATIENT
Start: 2022-07-07 | End: 2022-07-26 | Stop reason: SDUPTHER

## 2022-07-21 RX ORDER — LISINOPRIL 20 MG/1
20 TABLET ORAL DAILY
Qty: 30 TABLET | Refills: 0 | Status: SHIPPED | OUTPATIENT
Start: 2022-07-21 | End: 2022-07-26 | Stop reason: SDUPTHER

## 2022-07-26 ENCOUNTER — OFFICE VISIT (OUTPATIENT)
Dept: FAMILY MEDICINE CLINIC | Facility: CLINIC | Age: 57
End: 2022-07-26

## 2022-07-26 ENCOUNTER — LAB (OUTPATIENT)
Dept: LAB | Facility: HOSPITAL | Age: 57
End: 2022-07-26

## 2022-07-26 VITALS
SYSTOLIC BLOOD PRESSURE: 133 MMHG | TEMPERATURE: 98.4 F | DIASTOLIC BLOOD PRESSURE: 85 MMHG | HEIGHT: 64 IN | HEART RATE: 100 BPM | BODY MASS INDEX: 38.93 KG/M2 | WEIGHT: 228 LBS | OXYGEN SATURATION: 98 %

## 2022-07-26 DIAGNOSIS — Z12.5 SCREENING FOR PROSTATE CANCER: ICD-10-CM

## 2022-07-26 DIAGNOSIS — E29.1 MALE HYPOGONADISM: Primary | ICD-10-CM

## 2022-07-26 DIAGNOSIS — R53.83 OTHER FATIGUE: ICD-10-CM

## 2022-07-26 DIAGNOSIS — I10 ESSENTIAL HYPERTENSION: ICD-10-CM

## 2022-07-26 LAB
DEPRECATED RDW RBC AUTO: 40.5 FL (ref 37–54)
ERYTHROCYTE [DISTWIDTH] IN BLOOD BY AUTOMATED COUNT: 13.5 % (ref 12.3–15.4)
HCT VFR BLD AUTO: 42.9 % (ref 37.5–51)
HGB BLD-MCNC: 14.3 G/DL (ref 13–17.7)
MCH RBC QN AUTO: 27.8 PG (ref 26.6–33)
MCHC RBC AUTO-ENTMCNC: 33.3 G/DL (ref 31.5–35.7)
MCV RBC AUTO: 83.3 FL (ref 79–97)
PLATELET # BLD AUTO: 305 10*3/MM3 (ref 140–450)
PMV BLD AUTO: 11.5 FL (ref 6–12)
RBC # BLD AUTO: 5.15 10*6/MM3 (ref 4.14–5.8)
WBC NRBC COR # BLD: 9.43 10*3/MM3 (ref 3.4–10.8)

## 2022-07-26 PROCEDURE — G0103 PSA SCREENING: HCPCS | Performed by: FAMILY MEDICINE

## 2022-07-26 PROCEDURE — 80053 COMPREHEN METABOLIC PANEL: CPT | Performed by: FAMILY MEDICINE

## 2022-07-26 PROCEDURE — 80061 LIPID PANEL: CPT | Performed by: FAMILY MEDICINE

## 2022-07-26 PROCEDURE — 85027 COMPLETE CBC AUTOMATED: CPT | Performed by: FAMILY MEDICINE

## 2022-07-26 PROCEDURE — 84403 ASSAY OF TOTAL TESTOSTERONE: CPT | Performed by: FAMILY MEDICINE

## 2022-07-26 PROCEDURE — 99213 OFFICE O/P EST LOW 20 MIN: CPT | Performed by: FAMILY MEDICINE

## 2022-07-26 RX ORDER — DICYCLOMINE HCL 20 MG
20 TABLET ORAL EVERY 6 HOURS
Qty: 360 TABLET | Refills: 3 | Status: SHIPPED | OUTPATIENT
Start: 2022-07-26

## 2022-07-26 RX ORDER — LISINOPRIL 20 MG/1
20 TABLET ORAL DAILY
Qty: 90 TABLET | Refills: 3 | Status: SHIPPED | OUTPATIENT
Start: 2022-07-26

## 2022-07-26 RX ORDER — TESTOSTERONE CYPIONATE 200 MG/ML
200 INJECTION, SOLUTION INTRAMUSCULAR
Qty: 2 ML | Refills: 5 | Status: SHIPPED | OUTPATIENT
Start: 2022-07-26

## 2022-07-26 RX ORDER — COLESEVELAM 180 1/1
1875 TABLET ORAL 2 TIMES DAILY WITH MEALS
Qty: 180 TABLET | Refills: 11 | Status: SHIPPED | OUTPATIENT
Start: 2022-07-26

## 2022-07-26 RX ORDER — VENLAFAXINE HYDROCHLORIDE 75 MG/1
75-150 CAPSULE, EXTENDED RELEASE ORAL DAILY
Qty: 180 CAPSULE | Refills: 3 | Status: SHIPPED | OUTPATIENT
Start: 2022-07-26

## 2022-07-26 NOTE — PROGRESS NOTES
" Subjective   Rob Josh Serrato is a 56 y.o. male.     Chief Complaint   Patient presents with   • Med Refill         Cc:  Hypogonadism, fatigue.     History of Present Illness     No testosterone a couple of months due to job change.  Feeling fatigued.   Afraid to decrease his effexor since gives him energy.  Takes zoloft 50mg at bedtime also.  Taking bentyl 20mg tid and bile acid resin, without he can not control his bowels.  This happened after losing his gallbladder.   Loves his new job, 3 days a week [employer]     Review of Systems   Constitutional: Positive for fatigue. Negative for chills and fever.   HENT: Negative for congestion, ear discharge, ear pain, facial swelling, hearing loss, postnasal drip, rhinorrhea, sinus pressure, sore throat, trouble swallowing and voice change.    Eyes: Negative for discharge, redness and visual disturbance.   Respiratory: Negative for cough, chest tightness, shortness of breath and wheezing.    Cardiovascular: Negative for chest pain and palpitations.   Gastrointestinal: Negative for abdominal pain, blood in stool, constipation, diarrhea, nausea and vomiting.   Endocrine: Negative for polydipsia and polyuria.   Genitourinary: Negative for dysuria, flank pain, hematuria and urgency.   Musculoskeletal: Negative for arthralgias, back pain, joint swelling and myalgias.   Skin: Negative for rash.   Neurological: Negative for dizziness, weakness, numbness and headaches.   Hematological: Negative for adenopathy.   Psychiatric/Behavioral: Negative for confusion and sleep disturbance. The patient is not nervous/anxious.            /85 (BP Location: Left arm, Patient Position: Sitting, Cuff Size: Adult)   Pulse 100   Temp 98.4 °F (36.9 °C) (Infrared)   Ht 162.6 cm (64.02\")   Wt 103 kg (228 lb)   SpO2 98%   BMI 39.12 kg/m²       Objective     Physical Exam  Vitals and nursing note reviewed.   Constitutional:       Appearance: Normal appearance. He is well-developed.   HENT: "      Head: Normocephalic and atraumatic.      Right Ear: External ear normal.      Left Ear: External ear normal.      Nose: Nose normal. No rhinorrhea.   Eyes:      General: No scleral icterus.     Extraocular Movements: Extraocular movements intact.      Conjunctiva/sclera: Conjunctivae normal.      Pupils: Pupils are equal, round, and reactive to light.   Cardiovascular:      Rate and Rhythm: Normal rate and regular rhythm.      Heart sounds: Normal heart sounds.     No friction rub. No gallop.   Pulmonary:      Effort: Pulmonary effort is normal.      Breath sounds: Normal breath sounds.   Abdominal:      General: Bowel sounds are normal. There is no distension.      Palpations: Abdomen is soft.      Tenderness: There is no abdominal tenderness.   Musculoskeletal:         General: No deformity. Normal range of motion.      Cervical back: Normal range of motion and neck supple.   Skin:     General: Skin is warm and dry.      Findings: No erythema or rash.   Neurological:      Mental Status: He is alert and oriented to person, place, and time.      Cranial Nerves: No cranial nerve deficit.   Psychiatric:         Behavior: Behavior normal.         Thought Content: Thought content normal.         Judgment: Judgment normal.             PAST MEDICAL HISTORY     Past Medical History:   Diagnosis Date   • Anxiety state    • Asthma    • Disorder of skeletal system    • Dyspnea    • Gastroesophageal reflux disease    • Hyperlipidemia    • Hypertension    • Malaise and fatigue    • Male hypogonadism    • Restless legs    • Sleep apnea    • Sleep apnea    • Tobacco dependence syndrome    • Ureteric stone     rt 2mm stone   • Vertigo       PAST SURGICAL HISTORY     Past Surgical History:   Procedure Laterality Date   • CHOLECYSTECTOMY     • COLONOSCOPY     • LAPAROSCOPY ESOPHAGOGASTRIC FUNDOPLASTY HYBRID  03/09/2010    Fundoplasty, laparoscopic (1)      SOCIAL HISTORY     Social History     Socioeconomic History   • Marital  "status:    Tobacco Use   • Smoking status: Current Every Day Smoker     Packs/day: 0.25     Years: 38.00     Pack years: 9.50     Types: Electronic Cigarette, Cigarettes     Start date: 12/30/1982   • Smokeless tobacco: Never Used   • Tobacco comment: 03/09/2020 - Patient states he began utilization of Cigarettes at the age of 17 and utilized 1 - 1 1/2 packs(s) of Cigarettes per day.  Patient states he ceased utilization of Cigarettes and began utilization of Electronic Cigarette 2 years prio.   Substance and Sexual Activity   • Alcohol use: Yes     Comment: 03/09/2020 - Patient states \"occasional\" consumption of alcoholic beverages.   • Drug use: No   • Sexual activity: Defer      ALLERGIES   Codeine and Penicillins   MEDICATIONS     Current Outpatient Medications   Medication Sig Dispense Refill   • colesevelam (WELCHOL) 625 MG tablet Take 3 tablets by mouth 2 (Two) Times a Day With Meals. 180 tablet 11   • dicyclomine (BENTYL) 20 MG tablet Take 1 tablet by mouth Every 6 (Six) Hours. 360 tablet 3   • lisinopril (PRINIVIL,ZESTRIL) 20 MG tablet Take 1 tablet by mouth Daily. NEED OFFICE VISIT AND LABS PLEASE. 90 tablet 3   • sertraline (ZOLOFT) 50 MG tablet Take 1 tablet by mouth Daily. 90 tablet 3   • Testosterone Cypionate (DEPOTESTOTERONE CYPIONATE) 200 MG/ML injection Inject 1 mL into the appropriate muscle as directed by prescriber Every 14 (Fourteen) Days. 2 mL 5   • vitamin B-12 (CYANOCOBALAMIN) 1000 MCG tablet Take 1,000 mcg by mouth Daily.     • B-D 3CC LUER-RODERICK SYR 25GX1/2\" 25G X 1-1/2\" 3 ML misc      • folic acid (FOLVITE) 1 MG tablet TAKE 1 TABLET BY MOUTH DAILY. 90 tablet 3   • meloxicam (MOBIC) 15 MG tablet TAKE 1 TABLET BY MOUTH DAILY. 30 tablet 10   • tiZANidine (ZANAFLEX) 4 MG tablet Take 1 tablet by mouth Every 6 (Six) Hours As Needed for Muscle Spasms. 60 tablet 11   • venlafaxine XR (Effexor XR) 75 MG 24 hr capsule Take 1-2 capsules by mouth Daily. 180 capsule 3     Current " Facility-Administered Medications   Medication Dose Route Frequency Provider Last Rate Last Admin   • cyanocobalamin injection 1,000 mcg  1,000 mcg Intramuscular Q28 Days Cb Weaver MD   1,000 mcg at 03/26/19 0957        The following portions of the patient's history were reviewed and updated as appropriate: allergies, current medications, past family history, past medical history, past social history, past surgical history and problem list.        Assessment & Plan   Diagnoses and all orders for this visit:    1. Male hypogonadism (Primary)  -     Testosterone  -     Testosterone Cypionate (DEPOTESTOTERONE CYPIONATE) 200 MG/ML injection; Inject 1 mL into the appropriate muscle as directed by prescriber Every 14 (Fourteen) Days.  Dispense: 2 mL; Refill: 5    2. Essential hypertension  -     CBC (No Diff)  -     Comprehensive Metabolic Panel  -     Lipid Panel    3. Screening for prostate cancer  -     PSA Screen    Other orders  -     sertraline (ZOLOFT) 50 MG tablet; Take 1 tablet by mouth Daily.  Dispense: 90 tablet; Refill: 3  -     colesevelam (WELCHOL) 625 MG tablet; Take 3 tablets by mouth 2 (Two) Times a Day With Meals.  Dispense: 180 tablet; Refill: 11  -     dicyclomine (BENTYL) 20 MG tablet; Take 1 tablet by mouth Every 6 (Six) Hours.  Dispense: 360 tablet; Refill: 3  -     lisinopril (PRINIVIL,ZESTRIL) 20 MG tablet; Take 1 tablet by mouth Daily. NEED OFFICE VISIT AND LABS PLEASE.  Dispense: 90 tablet; Refill: 3  -     venlafaxine XR (Effexor XR) 75 MG 24 hr capsule; Take 1-2 capsules by mouth Daily.  Dispense: 180 capsule; Refill: 3      Bentyl probably causing the fatigue.  Try smallest effective dose, pill cutter.     Try cutting back on effexor to 75mg    He has not had colonoscopy that I see and no show at gi appointment                      No follow-ups on file.                  This document has been electronically signed by Cb Weaver MD on July 26, 2022 17:12 CDT

## 2022-07-27 LAB
ALBUMIN SERPL-MCNC: 4.7 G/DL (ref 3.5–5.2)
ALBUMIN/GLOB SERPL: 2 G/DL
ALP SERPL-CCNC: 87 U/L (ref 39–117)
ALT SERPL W P-5'-P-CCNC: 22 U/L (ref 1–41)
ANION GAP SERPL CALCULATED.3IONS-SCNC: 12 MMOL/L (ref 5–15)
AST SERPL-CCNC: 13 U/L (ref 1–40)
BILIRUB SERPL-MCNC: 0.4 MG/DL (ref 0–1.2)
BUN SERPL-MCNC: 15 MG/DL (ref 6–20)
BUN/CREAT SERPL: 18.8 (ref 7–25)
CALCIUM SPEC-SCNC: 9.8 MG/DL (ref 8.6–10.5)
CHLORIDE SERPL-SCNC: 101 MMOL/L (ref 98–107)
CHOLEST SERPL-MCNC: 225 MG/DL (ref 0–200)
CO2 SERPL-SCNC: 25 MMOL/L (ref 22–29)
CREAT SERPL-MCNC: 0.8 MG/DL (ref 0.76–1.27)
EGFRCR SERPLBLD CKD-EPI 2021: 104.5 ML/MIN/1.73
GLOBULIN UR ELPH-MCNC: 2.4 GM/DL
GLUCOSE SERPL-MCNC: 105 MG/DL (ref 65–99)
HDLC SERPL-MCNC: 55 MG/DL (ref 40–60)
LDLC SERPL CALC-MCNC: 138 MG/DL (ref 0–100)
LDLC/HDLC SERPL: 2.44 {RATIO}
POTASSIUM SERPL-SCNC: 4.3 MMOL/L (ref 3.5–5.2)
PROT SERPL-MCNC: 7.1 G/DL (ref 6–8.5)
PSA SERPL-MCNC: 0.34 NG/ML (ref 0–4)
SODIUM SERPL-SCNC: 138 MMOL/L (ref 136–145)
TESTOST SERPL-MCNC: 219 NG/DL (ref 193–740)
TRIGL SERPL-MCNC: 178 MG/DL (ref 0–150)
VLDLC SERPL-MCNC: 32 MG/DL (ref 5–40)

## 2022-07-28 RX ORDER — PRAVASTATIN SODIUM 40 MG
40 TABLET ORAL DAILY
Qty: 90 TABLET | Refills: 3 | Status: SHIPPED | OUTPATIENT
Start: 2022-07-28

## 2022-09-23 RX ORDER — FOLIC ACID 1 MG/1
1 TABLET ORAL DAILY
Qty: 90 TABLET | Refills: 2 | Status: SHIPPED | OUTPATIENT
Start: 2022-09-23

## 2023-07-27 RX ORDER — LISINOPRIL 20 MG/1
20 TABLET ORAL DAILY
Qty: 90 TABLET | Refills: 0 | Status: SHIPPED | OUTPATIENT
Start: 2023-07-27

## 2023-08-01 ENCOUNTER — OFFICE VISIT (OUTPATIENT)
Dept: SLEEP MEDICINE | Facility: HOSPITAL | Age: 58
End: 2023-08-01
Payer: COMMERCIAL

## 2023-08-01 VITALS
OXYGEN SATURATION: 96 % | SYSTOLIC BLOOD PRESSURE: 122 MMHG | BODY MASS INDEX: 40.17 KG/M2 | HEIGHT: 64 IN | WEIGHT: 235.3 LBS | DIASTOLIC BLOOD PRESSURE: 69 MMHG | HEART RATE: 75 BPM

## 2023-08-01 DIAGNOSIS — G47.33 OBSTRUCTIVE SLEEP APNEA, ADULT: Primary | ICD-10-CM

## 2023-08-04 ENCOUNTER — DOCUMENTATION (OUTPATIENT)
Dept: SLEEP MEDICINE | Facility: HOSPITAL | Age: 58
End: 2023-08-04
Payer: COMMERCIAL

## 2023-08-16 ENCOUNTER — OFFICE VISIT (OUTPATIENT)
Dept: FAMILY MEDICINE CLINIC | Facility: CLINIC | Age: 58
End: 2023-08-16
Payer: COMMERCIAL

## 2023-08-16 VITALS
WEIGHT: 237 LBS | HEIGHT: 64 IN | HEART RATE: 106 BPM | OXYGEN SATURATION: 97 % | BODY MASS INDEX: 40.46 KG/M2 | TEMPERATURE: 98.4 F | SYSTOLIC BLOOD PRESSURE: 146 MMHG | DIASTOLIC BLOOD PRESSURE: 84 MMHG

## 2023-08-16 DIAGNOSIS — I10 ESSENTIAL HYPERTENSION: ICD-10-CM

## 2023-08-16 DIAGNOSIS — Z00.00 ANNUAL PHYSICAL EXAM: Primary | ICD-10-CM

## 2023-08-16 DIAGNOSIS — Z12.5 SCREENING FOR PROSTATE CANCER: ICD-10-CM

## 2023-08-16 DIAGNOSIS — E29.1 MALE HYPOGONADISM: ICD-10-CM

## 2023-08-16 PROCEDURE — 99396 PREV VISIT EST AGE 40-64: CPT | Performed by: FAMILY MEDICINE

## 2023-08-16 RX ORDER — DICYCLOMINE HCL 20 MG
20 TABLET ORAL EVERY 6 HOURS
Qty: 360 TABLET | Refills: 3 | Status: SHIPPED | OUTPATIENT
Start: 2023-08-16

## 2023-08-16 RX ORDER — PRAVASTATIN SODIUM 40 MG
TABLET ORAL
Qty: 90 TABLET | Refills: 3 | Status: SHIPPED | OUTPATIENT
Start: 2023-08-16

## 2023-08-16 RX ORDER — VENLAFAXINE HYDROCHLORIDE 75 MG/1
75-150 CAPSULE, EXTENDED RELEASE ORAL DAILY
Qty: 180 CAPSULE | Refills: 3 | Status: SHIPPED | OUTPATIENT
Start: 2023-08-16

## 2023-08-16 RX ORDER — FOLIC ACID 1 MG/1
1 TABLET ORAL DAILY
Qty: 90 TABLET | Refills: 3 | Status: SHIPPED | OUTPATIENT
Start: 2023-08-16

## 2023-08-16 RX ORDER — LISINOPRIL 20 MG/1
20 TABLET ORAL DAILY
Qty: 90 TABLET | Refills: 3 | Status: SHIPPED | OUTPATIENT
Start: 2023-08-16

## 2023-08-16 RX ORDER — MELOXICAM 15 MG/1
15 TABLET ORAL DAILY
Qty: 30 TABLET | Refills: 11 | Status: SHIPPED | OUTPATIENT
Start: 2023-08-16

## 2023-08-16 NOTE — PROGRESS NOTES
" Subjective   Rob Josh Serrato is a 57 y.o. male.     Chief Complaint   Patient presents with    Med Refill         CC:  ANNUAL PHYSICAL EXAM     History of Present Illness     Needs refills on everything  Bp high this am, he and wife arguing.  He doesn't check bp at home.   He is at a new job, hours and environment is better but basically doing the same thing.   He believes his testosterone is low and wants to be rechecked.   At one time he was on testosterone replacement therapy but his levels I thought were too high and we tried no testosterone to see how this worked out.       Review of Systems   Constitutional:  Negative for chills, fatigue and fever.   HENT:  Negative for congestion, ear discharge, ear pain, facial swelling, hearing loss, postnasal drip, rhinorrhea, sinus pressure, sore throat, trouble swallowing and voice change.    Eyes:  Negative for discharge, redness and visual disturbance.   Respiratory:  Negative for cough, chest tightness, shortness of breath and wheezing.    Cardiovascular:  Negative for chest pain and palpitations.   Gastrointestinal:  Negative for abdominal pain, blood in stool, constipation, diarrhea, nausea and vomiting.   Endocrine: Negative for polydipsia and polyuria.   Genitourinary:  Negative for dysuria, flank pain, hematuria and urgency.   Musculoskeletal:  Negative for arthralgias, back pain, joint swelling and myalgias.   Skin:  Negative for rash.   Neurological:  Negative for dizziness, weakness, numbness and headaches.   Hematological:  Negative for adenopathy.   Psychiatric/Behavioral:  Negative for confusion and sleep disturbance. The patient is not nervous/anxious.          /84 (BP Location: Left arm, Patient Position: Sitting, Cuff Size: Adult)   Pulse 106   Temp 98.4 øF (36.9 øC) (Infrared)   Ht 162.6 cm (64.02\")   Wt 108 kg (237 lb)   SpO2 97%   BMI 40.66 kg/mý       Objective     Physical Exam  Vitals and nursing note reviewed.   Constitutional:     "   Appearance: Normal appearance. He is well-developed.   HENT:      Head: Normocephalic and atraumatic.      Right Ear: External ear normal.      Left Ear: External ear normal.      Nose: Nose normal. No rhinorrhea.   Eyes:      General: No scleral icterus.     Extraocular Movements: Extraocular movements intact.      Conjunctiva/sclera: Conjunctivae normal.      Pupils: Pupils are equal, round, and reactive to light.   Neck:      Vascular: No carotid bruit.   Cardiovascular:      Rate and Rhythm: Normal rate and regular rhythm.      Heart sounds: Normal heart sounds.     No friction rub. No gallop.   Pulmonary:      Effort: Pulmonary effort is normal.      Breath sounds: Normal breath sounds.   Abdominal:      General: Bowel sounds are normal. There is no distension.      Palpations: Abdomen is soft.      Tenderness: There is no abdominal tenderness.   Musculoskeletal:         General: No deformity. Normal range of motion.      Cervical back: Normal range of motion and neck supple.   Skin:     General: Skin is warm and dry.      Findings: No erythema or rash.   Neurological:      Mental Status: He is alert and oriented to person, place, and time.      Cranial Nerves: No cranial nerve deficit.   Psychiatric:         Behavior: Behavior normal.         Thought Content: Thought content normal.         Judgment: Judgment normal.           PAST MEDICAL HISTORY     Past Medical History:   Diagnosis Date    Anxiety state     Asthma     Disorder of skeletal system     Dyspnea     Gastroesophageal reflux disease     Hyperlipidemia     Hypertension     Malaise and fatigue     Male hypogonadism     Restless legs     Sleep apnea     Sleep apnea     Tobacco dependence syndrome     Ureteric stone     rt 2mm stone    Vertigo       PAST SURGICAL HISTORY     Past Surgical History:   Procedure Laterality Date    CHOLECYSTECTOMY      COLONOSCOPY      LAPAROSCOPY ESOPHAGOGASTRIC FUNDOPLASTY HYBRID  03/09/2010    Fundoplasty,  "laparoscopic (1)      SOCIAL HISTORY     Social History     Socioeconomic History    Marital status:    Tobacco Use    Smoking status: Every Day     Packs/day: 0.25     Years: 38.00     Pack years: 9.50     Types: Electronic Cigarette, Cigarettes     Start date: 12/30/1982    Smokeless tobacco: Never    Tobacco comments:     03/09/2020 - Patient states he began utilization of Cigarettes at the age of 17 and utilized 1 - 1 1/2 packs(s) of Cigarettes per day.  Patient states he ceased utilization of Cigarettes and began utilization of Electronic Cigarette 2 years prio.   Substance and Sexual Activity    Alcohol use: Yes     Comment: 03/09/2020 - Patient states \"occasional\" consumption of alcoholic beverages.    Drug use: No    Sexual activity: Defer      ALLERGIES   Codeine and Penicillins   MEDICATIONS     Current Outpatient Medications   Medication Sig Dispense Refill    dicyclomine (BENTYL) 20 MG tablet Take 1 tablet by mouth Every 6 (Six) Hours. 360 tablet 3    folic acid (FOLVITE) 1 MG tablet Take 1 tablet by mouth Daily. 90 tablet 3    lisinopril (PRINIVIL,ZESTRIL) 20 MG tablet Take 1 tablet by mouth Daily. 90 tablet 3    meloxicam (MOBIC) 15 MG tablet Take 1 tablet by mouth Daily. 30 tablet 11    NON FORMULARY 600 mg. ALPHALIPOIC ACID      sertraline (ZOLOFT) 50 MG tablet Take 1 tablet by mouth Daily. 90 tablet 3    venlafaxine XR (Effexor XR) 75 MG 24 hr capsule Take 1-2 capsules by mouth Daily. 180 capsule 3    vitamin B-12 (CYANOCOBALAMIN) 1000 MCG tablet Take 1 tablet by mouth Daily.       Current Facility-Administered Medications   Medication Dose Route Frequency Provider Last Rate Last Admin    cyanocobalamin injection 1,000 mcg  1,000 mcg Intramuscular Q28 Days Cb Weaver MD   1,000 mcg at 03/26/19 0957        The following portions of the patient's history were reviewed and updated as appropriate: allergies, current medications, past family history, past medical history, past social " history, past surgical history and problem list.        Assessment & Plan   Diagnoses and all orders for this visit:    1. Annual physical exam (Primary)  -     CBC (No Diff)  -     Comprehensive Metabolic Panel  -     Lipid Panel  -     PSA Screen    2. Essential hypertension  -     CBC (No Diff)  -     Comprehensive Metabolic Panel  -     Lipid Panel    3. Screening for prostate cancer  -     PSA Screen    4. Male hypogonadism  -     FSH & LH; Future  -     Testosterone (Free & Total), LC / MS; Future    Other orders  -     dicyclomine (BENTYL) 20 MG tablet; Take 1 tablet by mouth Every 6 (Six) Hours.  Dispense: 360 tablet; Refill: 3  -     folic acid (FOLVITE) 1 MG tablet; Take 1 tablet by mouth Daily.  Dispense: 90 tablet; Refill: 3  -     lisinopril (PRINIVIL,ZESTRIL) 20 MG tablet; Take 1 tablet by mouth Daily.  Dispense: 90 tablet; Refill: 3  -     meloxicam (MOBIC) 15 MG tablet; Take 1 tablet by mouth Daily.  Dispense: 30 tablet; Refill: 11  -     sertraline (ZOLOFT) 50 MG tablet; Take 1 tablet by mouth Daily.  Dispense: 90 tablet; Refill: 3  -     venlafaxine XR (Effexor XR) 75 MG 24 hr capsule; Take 1-2 capsules by mouth Daily.  Dispense: 180 capsule; Refill: 3    Stool fit given  Counseled on colon cancer screening and monitoring his blood pressure.  The environment at work is not hot like it was at [facility].   Return for labs when fasting and between 8-10  Bp not at goal, will recheck when he comes in for labs.                    No follow-ups on file.                  This document has been electronically signed by Cb Weaver MD on August 16, 2023 11:41 CDT

## 2023-08-16 NOTE — TELEPHONE ENCOUNTER
Incoming Refill Request      Medication requested (name and dose): Pravastatin (Pravachol) 40 MG    Pharmacy where request should be sent: [facility]    Additional details provided by patient:     Best call back number: [phone]    Does the patient have less than a 3 day supply:  [x] Yes  [] No    Ajit Robertson Rep  08/16/23, 11:55 CDT

## 2023-08-21 ENCOUNTER — LAB (OUTPATIENT)
Dept: LAB | Facility: HOSPITAL | Age: 58
End: 2023-08-21
Payer: COMMERCIAL

## 2023-08-21 DIAGNOSIS — E29.1 MALE HYPOGONADISM: ICD-10-CM

## 2023-08-21 PROCEDURE — 84403 ASSAY OF TOTAL TESTOSTERONE: CPT

## 2023-08-21 PROCEDURE — 84402 ASSAY OF FREE TESTOSTERONE: CPT

## 2023-08-21 PROCEDURE — 83002 ASSAY OF GONADOTROPIN (LH): CPT

## 2023-08-21 PROCEDURE — 83001 ASSAY OF GONADOTROPIN (FSH): CPT

## 2023-08-22 LAB
FSH SERPL-ACNC: 2.46 MIU/ML
LH SERPL-ACNC: 4.01 MIU/ML

## 2023-08-30 LAB
TESTOST FREE SERPL-MCNC: 5 PG/ML (ref 7.2–24)
TESTOST SERPL-MCNC: 187.2 NG/DL (ref 264–916)

## 2023-09-07 DIAGNOSIS — E29.1 MALE HYPOGONADISM: Primary | ICD-10-CM

## 2023-09-07 RX ORDER — TESTOSTERONE CYPIONATE 200 MG/ML
200 INJECTION, SOLUTION INTRAMUSCULAR
Qty: 2 ML | Refills: 5 | Status: SHIPPED | OUTPATIENT
Start: 2023-09-07

## 2023-09-07 RX ORDER — SYRINGE W-NEEDLE,DISPOSAB,3 ML 25GX5/8"
1 SYRINGE, EMPTY DISPOSABLE MISCELLANEOUS
Qty: 10 EACH | Refills: 11 | Status: SHIPPED | OUTPATIENT
Start: 2023-09-07